# Patient Record
Sex: MALE | Race: WHITE | NOT HISPANIC OR LATINO | Employment: UNEMPLOYED | URBAN - NONMETROPOLITAN AREA
[De-identification: names, ages, dates, MRNs, and addresses within clinical notes are randomized per-mention and may not be internally consistent; named-entity substitution may affect disease eponyms.]

---

## 2017-01-03 DIAGNOSIS — G25.81 RESTLESS LEG SYNDROME: ICD-10-CM

## 2017-01-03 RX ORDER — ROPINIROLE 1 MG/1
1 TABLET, FILM COATED ORAL NIGHTLY
Qty: 30 TABLET | Refills: 2 | Status: SHIPPED | OUTPATIENT
Start: 2017-01-03 | End: 2017-04-06 | Stop reason: DRUGHIGH

## 2017-02-23 ENCOUNTER — OFFICE VISIT (OUTPATIENT)
Dept: FAMILY MEDICINE CLINIC | Facility: CLINIC | Age: 47
End: 2017-02-23

## 2017-02-23 ENCOUNTER — APPOINTMENT (OUTPATIENT)
Dept: LAB | Facility: HOSPITAL | Age: 47
End: 2017-02-23

## 2017-02-23 VITALS
SYSTOLIC BLOOD PRESSURE: 108 MMHG | HEIGHT: 67 IN | WEIGHT: 134 LBS | DIASTOLIC BLOOD PRESSURE: 68 MMHG | BODY MASS INDEX: 21.03 KG/M2

## 2017-02-23 DIAGNOSIS — G47.00 INSOMNIA, UNSPECIFIED TYPE: Primary | ICD-10-CM

## 2017-02-23 DIAGNOSIS — Z79.899 HIGH RISK MEDICATION USE: ICD-10-CM

## 2017-02-23 DIAGNOSIS — R53.83 FATIGUE, UNSPECIFIED TYPE: ICD-10-CM

## 2017-02-23 LAB
ALBUMIN SERPL-MCNC: 4.6 G/DL (ref 3.4–4.8)
ALBUMIN/GLOB SERPL: 1.6 G/DL (ref 1.1–1.8)
ALP SERPL-CCNC: 41 U/L (ref 38–126)
ALT SERPL W P-5'-P-CCNC: 24 U/L (ref 21–72)
ANION GAP SERPL CALCULATED.3IONS-SCNC: 10 MMOL/L (ref 5–15)
ARTICHOKE IGE QN: 148 MG/DL (ref 1–129)
AST SERPL-CCNC: 28 U/L (ref 17–59)
BILIRUB SERPL-MCNC: 0.5 MG/DL (ref 0.2–1.3)
BUN BLD-MCNC: 27 MG/DL (ref 7–21)
BUN/CREAT SERPL: 30 (ref 7–25)
CALCIUM SPEC-SCNC: 9.5 MG/DL (ref 8.4–10.2)
CHLORIDE SERPL-SCNC: 107 MMOL/L (ref 95–110)
CHOLEST SERPL-MCNC: 271 MG/DL (ref 0–199)
CO2 SERPL-SCNC: 24 MMOL/L (ref 22–31)
CREAT BLD-MCNC: 0.9 MG/DL (ref 0.7–1.3)
DEPRECATED RDW RBC AUTO: 41.8 FL (ref 35.1–43.9)
ERYTHROCYTE [DISTWIDTH] IN BLOOD BY AUTOMATED COUNT: 13.4 % (ref 11.5–14.5)
GFR SERPL CREATININE-BSD FRML MDRD: 90 ML/MIN/1.73 (ref 63–147)
GLOBULIN UR ELPH-MCNC: 2.9 GM/DL (ref 2.3–3.5)
GLUCOSE BLD-MCNC: 90 MG/DL (ref 60–100)
HBA1C MFR BLD: 5.61 % (ref 4–5.6)
HCT VFR BLD AUTO: 39.5 % (ref 39–49)
HDLC SERPL-MCNC: 60 MG/DL (ref 60–200)
HGB BLD-MCNC: 13.4 G/DL (ref 13.7–17.3)
LDLC/HDLC SERPL: 3.08 {RATIO} (ref 0–3.55)
MCH RBC QN AUTO: 29.1 PG (ref 26.5–34)
MCHC RBC AUTO-ENTMCNC: 33.9 G/DL (ref 31.5–36.3)
MCV RBC AUTO: 85.7 FL (ref 80–98)
PLATELET # BLD AUTO: 226 10*3/MM3 (ref 150–450)
PMV BLD AUTO: 9.4 FL (ref 8–12)
POTASSIUM BLD-SCNC: 4.5 MMOL/L (ref 3.5–5.1)
PROT SERPL-MCNC: 7.5 G/DL (ref 6.3–8.6)
RBC # BLD AUTO: 4.61 10*6/MM3 (ref 4.37–5.74)
SODIUM BLD-SCNC: 141 MMOL/L (ref 137–145)
TRIGL SERPL-MCNC: 131 MG/DL (ref 20–199)
TSH SERPL DL<=0.05 MIU/L-ACNC: 1.11 MIU/ML (ref 0.46–4.68)
VIT B12 BLD-MCNC: 377 PG/ML (ref 239–931)
WBC NRBC COR # BLD: 10.89 10*3/MM3 (ref 3.2–9.8)

## 2017-02-23 PROCEDURE — 80061 LIPID PANEL: CPT | Performed by: FAMILY MEDICINE

## 2017-02-23 PROCEDURE — 36415 COLL VENOUS BLD VENIPUNCTURE: CPT | Performed by: FAMILY MEDICINE

## 2017-02-23 PROCEDURE — 80053 COMPREHEN METABOLIC PANEL: CPT | Performed by: FAMILY MEDICINE

## 2017-02-23 PROCEDURE — 84443 ASSAY THYROID STIM HORMONE: CPT | Performed by: FAMILY MEDICINE

## 2017-02-23 PROCEDURE — 82607 VITAMIN B-12: CPT | Performed by: FAMILY MEDICINE

## 2017-02-23 PROCEDURE — 83036 HEMOGLOBIN GLYCOSYLATED A1C: CPT | Performed by: FAMILY MEDICINE

## 2017-02-23 PROCEDURE — 85027 COMPLETE CBC AUTOMATED: CPT | Performed by: FAMILY MEDICINE

## 2017-02-23 PROCEDURE — 82652 VIT D 1 25-DIHYDROXY: CPT | Performed by: FAMILY MEDICINE

## 2017-02-23 PROCEDURE — 99213 OFFICE O/P EST LOW 20 MIN: CPT | Performed by: FAMILY MEDICINE

## 2017-02-23 RX ORDER — QUETIAPINE FUMARATE 300 MG/1
300 TABLET, FILM COATED ORAL NIGHTLY
Qty: 30 TABLET | Refills: 2 | Status: SHIPPED | OUTPATIENT
Start: 2017-02-23 | End: 2017-05-30 | Stop reason: SDUPTHER

## 2017-02-23 NOTE — PROGRESS NOTES
"Subjective   Ehsan Pena is a 46 y.o. male.     History of Present Illness   Mr. Pena is a 45yo male that presents today for follow-up on insomnia and chronic fatigue.  He says that he has had issues with his sleep machine.  He had a new mask but he hasn't used it yet.  SOA is better.  Still feels very run down.  Takes him about 2 hours to fall asleep at night.  He has been taking seroquel at night and hasn't noticed that has helped him.  No side effects from the medication.        Current Outpatient Prescriptions:   •  QUEtiapine (SEROQUEL) 200 MG tablet, Take 1 tablet by mouth Every Night., Disp: 30 tablet, Rfl: 2  •  rOPINIRole (REQUIP) 1 MG tablet, Take 1 tablet by mouth Every Night. Take 1 hour before bedtime., Disp: 30 tablet, Rfl: 2    The following portions of the patient's history were reviewed and updated as appropriate: allergies, current medications, past family history, past medical history, past social history, past surgical history and problem list.    Review of Systems   Constitutional: Positive for fatigue. Negative for activity change, appetite change, chills, diaphoresis and fever.   HENT: Negative for congestion and sore throat.    Respiratory: Negative for cough, chest tightness and wheezing.    Cardiovascular: Negative for chest pain, palpitations and leg swelling.   Gastrointestinal: Negative for abdominal pain, nausea and vomiting.   Musculoskeletal: Negative for arthralgias, joint swelling, myalgias and neck pain.   Skin: Negative for rash.   Neurological: Negative for weakness, numbness and headaches.   Psychiatric/Behavioral: Positive for sleep disturbance. Negative for dysphoric mood. The patient is nervous/anxious.        Objective    Vitals:    02/23/17 1622   BP: 108/68   Weight: 134 lb (60.8 kg)   Height: 67\" (170.2 cm)     Physical Exam   Constitutional: He is oriented to person, place, and time. He appears well-developed and well-nourished. No distress.   Cardiovascular: Normal " rate, regular rhythm and normal heart sounds.    No murmur heard.  No LE edema.   Pulmonary/Chest: Effort normal and breath sounds normal. No respiratory distress.   Neurological: He is alert and oriented to person, place, and time.   Psychiatric: He has a normal mood and affect. Thought content normal.   Psychomotor agitation    Nursing note and vitals reviewed.      Assessment/Plan   Problems Addressed this Visit        Other    Fatigue    Relevant Orders    Comprehensive Metabolic Panel (Completed)    CBC (No Diff) (Completed)    Vitamin B12 (Completed)    Vitamin D 1,25 Dihydroxy    TSH (Completed)    Insomnia - Primary      Other Visit Diagnoses     High risk medication use        Relevant Orders    Comprehensive Metabolic Panel (Completed)    Hemoglobin A1c (Completed)    Lipid Panel (Completed)    CBC (No Diff) (Completed)    Vitamin B12 (Completed)    Vitamin D 1,25 Dihydroxy    TSH (Completed)        1.) Insomnia- Hasn't been using new mask for his CPAP.  He always seems to be resistant to trying what he thinks will help when it comes to the machine. Drug his feet to even try the machine.  Will try new mask. Increase seroquel and see if that helps.  2.) Fatigue-  Will recheck TSH and vitamin levels today.  RTC in 1.5 months or sooner PRN

## 2017-02-27 LAB — 1,25(OH)2D3 SERPL-MCNC: 82.3 PG/ML (ref 19.9–79.3)

## 2017-02-28 ENCOUNTER — APPOINTMENT (OUTPATIENT)
Dept: CT IMAGING | Facility: HOSPITAL | Age: 47
End: 2017-02-28

## 2017-04-06 ENCOUNTER — OFFICE VISIT (OUTPATIENT)
Dept: FAMILY MEDICINE CLINIC | Facility: CLINIC | Age: 47
End: 2017-04-06

## 2017-04-06 VITALS
WEIGHT: 137 LBS | BODY MASS INDEX: 21.5 KG/M2 | DIASTOLIC BLOOD PRESSURE: 76 MMHG | HEIGHT: 67 IN | SYSTOLIC BLOOD PRESSURE: 120 MMHG

## 2017-04-06 DIAGNOSIS — R53.82 CHRONIC FATIGUE: Primary | ICD-10-CM

## 2017-04-06 PROCEDURE — 99213 OFFICE O/P EST LOW 20 MIN: CPT | Performed by: FAMILY MEDICINE

## 2017-04-06 RX ORDER — ROPINIROLE 2 MG/1
2 TABLET, FILM COATED ORAL NIGHTLY
Qty: 30 TABLET | Refills: 2 | Status: SHIPPED | OUTPATIENT
Start: 2017-04-06 | End: 2017-05-30 | Stop reason: SDUPTHER

## 2017-04-06 NOTE — PROGRESS NOTES
"Subjective   Ehsan Pena is a 46 y.o. male.     History of Present Illness   Mr. Pena is a 45yo male that presents today for follow-up on sleep issues and chronic fatigue.  Since he was here last he has been trying his new mask and that has been difficult to get used to.    He says that he sleeps about 9 hours of sleep a night but it isn't restful.  He is waiting to see a new sleep specialist.  He has been taking seroquel and requip. He was told that his legs were jerking a lot in his sleep and he never had noticed that.  He seems to think that was part of the issue.      Current Outpatient Prescriptions:   •  QUEtiapine (SEROQUEL) 300 MG tablet, Take 1 tablet by mouth Every Night., Disp: 30 tablet, Rfl: 2  •  rOPINIRole (REQUIP) 1 MG tablet, Take 1 tablet by mouth Every Night. Take 1 hour before bedtime., Disp: 30 tablet, Rfl: 2    The following portions of the patient's history were reviewed and updated as appropriate: allergies, current medications, past family history, past medical history, past social history, past surgical history and problem list.    Review of Systems   Constitutional: Positive for fatigue. Negative for activity change, appetite change, chills, diaphoresis and fever.   HENT: Negative for congestion and sore throat.    Respiratory: Negative for cough, chest tightness and wheezing.    Cardiovascular: Negative for chest pain, palpitations and leg swelling.   Gastrointestinal: Negative for abdominal pain, nausea and vomiting.   Musculoskeletal: Negative for arthralgias, joint swelling, myalgias and neck pain.   Skin: Negative for rash.   Neurological: Negative for weakness, numbness and headaches.   Psychiatric/Behavioral: Positive for sleep disturbance. Negative for dysphoric mood. The patient is nervous/anxious.        Objective    Vitals:    04/06/17 1527   BP: 120/76   Weight: 137 lb (62.1 kg)   Height: 67\" (170.2 cm)     Physical Exam   Constitutional: He is oriented to person, place, and " time. He appears well-developed and well-nourished. No distress.   Cardiovascular: Normal rate, regular rhythm and normal heart sounds.    No murmur heard.  No LE edema.   Pulmonary/Chest: Effort normal and breath sounds normal. No respiratory distress.   Neurological: He is alert and oriented to person, place, and time.   Psychiatric: He has a normal mood and affect. Thought content normal.   Seemed more calm today   Nursing note and vitals reviewed.      Assessment/Plan   Problems Addressed this Visit        Other    Fatigue - Primary        Hopefully he will get back into see sleep doctor for re-eval when we get new person here.  Mask isn't working well for him.  Continue seroquel.  Will increase his requip and see if that helps with leg issue.  RTC in 2 months or sooner PRN

## 2017-04-11 ENCOUNTER — HOSPITAL ENCOUNTER (OUTPATIENT)
Dept: CT IMAGING | Facility: HOSPITAL | Age: 47
Discharge: HOME | End: 2017-04-11
Admitting: PSYCHIATRY & NEUROLOGY

## 2017-04-11 DIAGNOSIS — T85.113A: ICD-10-CM

## 2017-04-11 PROCEDURE — 70470 CT HEAD/BRAIN W/O & W/DYE: CPT

## 2017-04-11 PROCEDURE — 0 IOPAMIDOL 61 % SOLUTION: Performed by: PSYCHIATRY & NEUROLOGY

## 2017-04-11 RX ADMIN — IOPAMIDOL 91 ML: 612 INJECTION, SOLUTION INTRAVENOUS at 18:30

## 2017-05-18 ENCOUNTER — OFFICE VISIT (OUTPATIENT)
Dept: FAMILY MEDICINE CLINIC | Facility: CLINIC | Age: 47
End: 2017-05-18

## 2017-05-18 VITALS
BODY MASS INDEX: 21.88 KG/M2 | HEIGHT: 67 IN | DIASTOLIC BLOOD PRESSURE: 72 MMHG | SYSTOLIC BLOOD PRESSURE: 120 MMHG | WEIGHT: 139.4 LBS

## 2017-05-18 DIAGNOSIS — G47.30 SLEEP APNEA, UNSPECIFIED TYPE: Primary | ICD-10-CM

## 2017-05-18 DIAGNOSIS — R53.82 CHRONIC FATIGUE: ICD-10-CM

## 2017-05-18 PROCEDURE — 99213 OFFICE O/P EST LOW 20 MIN: CPT | Performed by: FAMILY MEDICINE

## 2017-05-30 ENCOUNTER — TELEPHONE (OUTPATIENT)
Dept: FAMILY MEDICINE CLINIC | Facility: CLINIC | Age: 47
End: 2017-05-30

## 2017-05-30 RX ORDER — ROPINIROLE 2 MG/1
2 TABLET, FILM COATED ORAL NIGHTLY
Qty: 30 TABLET | Refills: 2 | Status: SHIPPED | OUTPATIENT
Start: 2017-05-30 | End: 2017-09-18 | Stop reason: SDUPTHER

## 2017-05-30 RX ORDER — QUETIAPINE FUMARATE 300 MG/1
300 TABLET, FILM COATED ORAL NIGHTLY
Qty: 30 TABLET | Refills: 2 | Status: SHIPPED | OUTPATIENT
Start: 2017-05-30 | End: 2017-09-18 | Stop reason: SDUPTHER

## 2017-09-18 RX ORDER — QUETIAPINE FUMARATE 300 MG/1
300 TABLET, FILM COATED ORAL NIGHTLY
Qty: 30 TABLET | Refills: 2 | Status: SHIPPED | OUTPATIENT
Start: 2017-09-18 | End: 2018-01-15 | Stop reason: SDUPTHER

## 2017-09-18 RX ORDER — ROPINIROLE 2 MG/1
2 TABLET, FILM COATED ORAL NIGHTLY
Qty: 30 TABLET | Refills: 2 | Status: SHIPPED | OUTPATIENT
Start: 2017-09-18 | End: 2018-02-12 | Stop reason: SDUPTHER

## 2017-09-19 ENCOUNTER — TELEPHONE (OUTPATIENT)
Dept: FAMILY MEDICINE CLINIC | Facility: CLINIC | Age: 47
End: 2017-09-19

## 2017-09-19 NOTE — TELEPHONE ENCOUNTER
Refills were sent on 9/18/17, pharmacy called. They have the scripts that were   Message left letting the patient know that the scripts were sent yesterday and they pharmacy does have the scripts will be filled when it is time for them to be refilled.     I did leave my contact information & number incase he has any questions    ----- Message from Felicita Lee sent at 9/19/2017  2:59 PM CDT -----  Contact: Ehsan  Needs refills for Quetiapine 300 mg and Ropinirole 2 mg to [facility] seezuleyma Jarrell    [phone]

## 2017-10-09 ENCOUNTER — OFFICE VISIT (OUTPATIENT)
Dept: SLEEP MEDICINE | Facility: HOSPITAL | Age: 47
End: 2017-10-09

## 2017-10-09 VITALS — BODY MASS INDEX: 22.08 KG/M2 | WEIGHT: 141 LBS | SYSTOLIC BLOOD PRESSURE: 120 MMHG | DIASTOLIC BLOOD PRESSURE: 66 MMHG

## 2017-10-09 DIAGNOSIS — F51.04 PSYCHOPHYSIOLOGICAL INSOMNIA: ICD-10-CM

## 2017-10-09 DIAGNOSIS — G47.33 OBSTRUCTIVE SLEEP APNEA, ADULT: Primary | ICD-10-CM

## 2017-10-09 PROCEDURE — 99213 OFFICE O/P EST LOW 20 MIN: CPT | Performed by: INTERNAL MEDICINE

## 2017-10-09 NOTE — PROGRESS NOTES
Sleep Clinic Follow Up    Date: 10/9/2017  Primary Care Physician: Bebe Jarrell MD      Interim History (1/3):  Since the last visit on 11/29/2016, patient has:      1)  RADHA - Has not remained compliant with CPAP. He has no compliance since 03/2017. The machine has not been turned on since 03/2017. He gets very frustrated with use and says mask is uncomfortable. He wears it only for ~ 15 minutes. He is on seroquel. He averages 1 night per month with difficulty returning to sleep. He is on seroquel.    PAP Data:  Time frame: 09/03/2016 - 03/28/2017   Compliance 9.4 %  PAP range : 5-12 cm H2O  Average 90% pressure: 7.2-9.2 cmH2O  Leak: 13.75 minutes   Average AHI 12.9 events/hr  Mask type: FFM and nasal mask  DME: [facility]    Bed time: 0100  Sleep latency: 120 minutes  Number of times awakens during the night: 0-2  Wake time: 1300  Estimated total sleep time at night: 9 hours  Caffeine intake: 2 soda  Alcohol intake: none  Nap time: none  Sleepiness with Driving: none    [place] - 1    2) Patient denies RLS symptoms.     PMHx, FH, SH reviewed and pertinent changes are: increase in seroquel      REVIEW OF SYSTEMS:   Negative for chest pain, fever, chills, SOA, abdominal pain. Smoking: none      Exam (6-11/12):    Vitals:    10/09/17 1558   BP: 120/66     Body mass index is 22.08 kg/(m^2).  Gen:  No distress, conversant, pleasant, appears stated age, alert, oriented  Eyes:   Anicteric sclera, moist conjunctiva, no lid lag     PERRLA, EOMI   Heent:   NC/AT    Oropharynx clear, Mallampati 3    normal hearing  Lungs:  Normal effort, non-labored breathing    Clear to auscultation    CV:  Normal S1/S2, without murmur    No lower extremity edema  ABD:  Soft, normal bowel sounds    Skin:  Normal tone, texture and turgor    Psych:  Appropriate affect  Neuro:  CN 2-12 intact, odd affect, ? Mild MR /Autism        Past Medical History:   Diagnosis Date   • Dyspnea on exertion    • Fatigue    • Impulse control disorder    •  Insomnia    • Major depressive disorder, recurrent    • Restless legs    • Sleep apnea    • Spina bifida        Current Outpatient Prescriptions:   •  carbamide peroxide (DEBROX) 6.5 % otic solution, Administer 5 drops into both ears As Needed for Ear Pain., Disp: 22 mL, Rfl: 2  •  QUEtiapine (SEROQUEL) 300 MG tablet, Take 1 tablet by mouth Every Night., Disp: 30 tablet, Rfl: 2  •  rOPINIRole (REQUIP) 2 MG tablet, Take 1 tablet by mouth Every Night., Disp: 30 tablet, Rfl: 2      ASSESSMENT:     1. Obstructive sleep apnea  1. PSG on 09/23/2015, AHI of 10.6  2. Previous PSG in [place] ([place]?)  3. Currently on 5-12 cm H2O  4. Non-compliant with CPAP  5. Poor mask fit   6. Setup for sleep education  7. Script for PAP supplies for 3 months  8. Return to clinic in 3 months after sleep education.  2. Insomnia - sleep onset - would stop seroquel   3. Insomnia - sleep maintenance - very mild  4. RLS   1. On ropinirole, suggest stoping 1 day per week  5. Chronic fatigue - present for 17 years, worse the last 8 years.    Total time 20 min, more than half spent in face to face counseling and coordination of care.     This document has been electronically signed by Quinton Lopez MD on October 9, 2017         CC: Bebe Jarrell MD          No ref. provider found

## 2017-10-10 ENCOUNTER — OFFICE VISIT (OUTPATIENT)
Dept: FAMILY MEDICINE CLINIC | Facility: CLINIC | Age: 47
End: 2017-10-10

## 2017-10-10 VITALS
DIASTOLIC BLOOD PRESSURE: 62 MMHG | BODY MASS INDEX: 22.32 KG/M2 | HEIGHT: 67 IN | WEIGHT: 142.2 LBS | SYSTOLIC BLOOD PRESSURE: 110 MMHG

## 2017-10-10 DIAGNOSIS — R73.03 PREDIABETES: ICD-10-CM

## 2017-10-10 DIAGNOSIS — R53.82 CHRONIC FATIGUE: ICD-10-CM

## 2017-10-10 DIAGNOSIS — J30.89 NON-SEASONAL ALLERGIC RHINITIS, UNSPECIFIED CHRONICITY, UNSPECIFIED TRIGGER: ICD-10-CM

## 2017-10-10 DIAGNOSIS — G47.00 INSOMNIA, UNSPECIFIED TYPE: Primary | ICD-10-CM

## 2017-10-10 DIAGNOSIS — Z13.220 LIPID SCREENING: ICD-10-CM

## 2017-10-10 DIAGNOSIS — Z23 NEED FOR IMMUNIZATION AGAINST INFLUENZA: ICD-10-CM

## 2017-10-10 PROCEDURE — 90471 IMMUNIZATION ADMIN: CPT | Performed by: FAMILY MEDICINE

## 2017-10-10 PROCEDURE — 90686 IIV4 VACC NO PRSV 0.5 ML IM: CPT | Performed by: FAMILY MEDICINE

## 2017-10-10 PROCEDURE — 99213 OFFICE O/P EST LOW 20 MIN: CPT | Performed by: FAMILY MEDICINE

## 2017-10-10 RX ORDER — FLUTICASONE PROPIONATE 50 MCG
2 SPRAY, SUSPENSION (ML) NASAL DAILY
Qty: 1 BOTTLE | Refills: 1 | Status: SHIPPED | OUTPATIENT
Start: 2017-10-10 | End: 2018-04-05

## 2017-10-11 ENCOUNTER — APPOINTMENT (OUTPATIENT)
Dept: LAB | Facility: HOSPITAL | Age: 47
End: 2017-10-11

## 2017-10-11 LAB
ARTICHOKE IGE QN: 163 MG/DL (ref 1–129)
CHOLEST SERPL-MCNC: 254 MG/DL (ref 0–199)
HBA1C MFR BLD: 5.5 % (ref 4–5.6)
HDLC SERPL-MCNC: 52 MG/DL (ref 60–200)
LDLC/HDLC SERPL: 3.03 {RATIO} (ref 0–3.55)
TRIGL SERPL-MCNC: 222 MG/DL (ref 20–199)

## 2017-10-11 PROCEDURE — 80061 LIPID PANEL: CPT | Performed by: FAMILY MEDICINE

## 2017-10-11 PROCEDURE — 36415 COLL VENOUS BLD VENIPUNCTURE: CPT | Performed by: FAMILY MEDICINE

## 2017-10-11 PROCEDURE — 83036 HEMOGLOBIN GLYCOSYLATED A1C: CPT | Performed by: FAMILY MEDICINE

## 2017-10-13 PROBLEM — E78.2 MIXED HYPERLIPIDEMIA: Status: ACTIVE | Noted: 2017-10-13

## 2017-10-13 RX ORDER — SIMVASTATIN 40 MG
40 TABLET ORAL NIGHTLY
Qty: 90 TABLET | Refills: 2 | Status: SHIPPED | OUTPATIENT
Start: 2017-10-13 | End: 2018-02-12 | Stop reason: SDUPTHER

## 2018-01-15 RX ORDER — QUETIAPINE FUMARATE 300 MG/1
TABLET, FILM COATED ORAL
Qty: 30 TABLET | Refills: 1 | Status: SHIPPED | OUTPATIENT
Start: 2018-01-15 | End: 2018-02-12 | Stop reason: SDUPTHER

## 2018-02-12 ENCOUNTER — TELEPHONE (OUTPATIENT)
Dept: FAMILY MEDICINE CLINIC | Facility: CLINIC | Age: 48
End: 2018-02-12

## 2018-02-12 RX ORDER — ROPINIROLE 2 MG/1
2 TABLET, FILM COATED ORAL NIGHTLY
Qty: 30 TABLET | Refills: 2 | Status: SHIPPED | OUTPATIENT
Start: 2018-02-12 | End: 2018-06-03 | Stop reason: SDUPTHER

## 2018-02-12 RX ORDER — QUETIAPINE FUMARATE 300 MG/1
300 TABLET, FILM COATED ORAL NIGHTLY
Qty: 30 TABLET | Refills: 2 | Status: SHIPPED | OUTPATIENT
Start: 2018-02-12 | End: 2018-06-04 | Stop reason: SDUPTHER

## 2018-02-12 RX ORDER — SIMVASTATIN 40 MG
40 TABLET ORAL NIGHTLY
Qty: 90 TABLET | Refills: 2 | Status: SHIPPED | OUTPATIENT
Start: 2018-02-12 | End: 2018-06-04 | Stop reason: SDUPTHER

## 2018-02-12 NOTE — TELEPHONE ENCOUNTER
Requested Refills Sent    ----- Message from Felicita Lee sent at 2/12/2018  3:23 PM CST -----  Contact: MARJORIE  Needs refills for   Ropinirole 2 mg  Simvastatin 40 mg  Quetiapine 300 mg    To [facility]

## 2018-04-05 ENCOUNTER — OFFICE VISIT (OUTPATIENT)
Dept: FAMILY MEDICINE CLINIC | Facility: CLINIC | Age: 48
End: 2018-04-05

## 2018-04-05 VITALS
SYSTOLIC BLOOD PRESSURE: 110 MMHG | DIASTOLIC BLOOD PRESSURE: 74 MMHG | HEIGHT: 67 IN | BODY MASS INDEX: 22.29 KG/M2 | WEIGHT: 142 LBS

## 2018-04-05 DIAGNOSIS — G47.00 INSOMNIA, UNSPECIFIED TYPE: ICD-10-CM

## 2018-04-05 DIAGNOSIS — R53.82 CHRONIC FATIGUE: ICD-10-CM

## 2018-04-05 DIAGNOSIS — R53.83 FATIGUE, UNSPECIFIED TYPE: Primary | ICD-10-CM

## 2018-04-05 PROCEDURE — 99213 OFFICE O/P EST LOW 20 MIN: CPT | Performed by: FAMILY MEDICINE

## 2018-04-05 PROCEDURE — 96372 THER/PROPH/DIAG INJ SC/IM: CPT | Performed by: FAMILY MEDICINE

## 2018-04-05 RX ORDER — CYANOCOBALAMIN 1000 UG/ML
1000 INJECTION, SOLUTION INTRAMUSCULAR; SUBCUTANEOUS ONCE
Status: COMPLETED | OUTPATIENT
Start: 2018-04-05 | End: 2018-04-05

## 2018-04-05 RX ORDER — CYANOCOBALAMIN (VITAMIN B-12) 1000 MCG
500 TABLET, SUBLINGUAL SUBLINGUAL DAILY
Qty: 90 TABLET | Refills: 1 | Status: SHIPPED | OUTPATIENT
Start: 2018-04-05 | End: 2018-06-04 | Stop reason: SDUPTHER

## 2018-04-05 RX ADMIN — CYANOCOBALAMIN 1000 MCG: 1000 INJECTION, SOLUTION INTRAMUSCULAR; SUBCUTANEOUS at 16:32

## 2018-04-05 NOTE — PROGRESS NOTES
Subjective   Ehsan Pena is a 47 y.o. male.     Fatigue   This is a chronic problem. The current episode started more than 1 year ago. The problem occurs daily. The problem has been unchanged. Associated symptoms include fatigue. Pertinent negatives include no abdominal pain, anorexia, arthralgias, change in bowel habit, chest pain, chills, congestion, coughing, diaphoresis, fever, headaches, joint swelling, myalgias, nausea, neck pain, numbness, rash, sore throat, swollen glands, urinary symptoms, vertigo, visual change, vomiting or weakness. The symptoms are aggravated by stress. Treatments tried: He has been using CPAP at night.     Insomnia   This is a chronic problem. The current episode started more than 1 year ago. The problem occurs intermittently. The problem has been gradually improving. Associated symptoms include fatigue. Pertinent negatives include no abdominal pain, anorexia, arthralgias, change in bowel habit, chest pain, chills, congestion, coughing, diaphoresis, fever, headaches, joint swelling, myalgias, nausea, neck pain, numbness, rash, sore throat, swollen glands, urinary symptoms, vertigo, visual change, vomiting or weakness. Nothing aggravates the symptoms. Treatments tried: seroquel and CPAP. The treatment provided mild relief.          Current Outpatient Prescriptions:   •  carbamide peroxide (DEBROX) 6.5 % otic solution, Administer 5 drops into both ears As Needed for Ear Pain., Disp: 22 mL, Rfl: 2  •  QUEtiapine (SEROquel) 300 MG tablet, Take 1 tablet by mouth Every Night., Disp: 30 tablet, Rfl: 2  •  rOPINIRole (REQUIP) 2 MG tablet, Take 1 tablet by mouth Every Night., Disp: 30 tablet, Rfl: 2  •  simvastatin (ZOCOR) 40 MG tablet, Take 1 tablet by mouth Every Night., Disp: 90 tablet, Rfl: 2    The following portions of the patient's history were reviewed and updated as appropriate: allergies, current medications, past family history, past medical history, past social history, past  "surgical history and problem list.    Review of Systems   Constitutional: Positive for fatigue. Negative for activity change, appetite change, chills, diaphoresis, fever and unexpected weight change.   HENT: Negative for congestion and sore throat.    Respiratory: Negative for cough, shortness of breath and wheezing.    Cardiovascular: Negative for chest pain, palpitations and leg swelling.   Gastrointestinal: Negative for abdominal distention, abdominal pain, anorexia, change in bowel habit, constipation, diarrhea, nausea and vomiting.   Musculoskeletal: Negative for arthralgias, joint swelling, myalgias and neck pain.   Skin: Negative for rash.   Neurological: Negative for vertigo, weakness, numbness and headaches.   Psychiatric/Behavioral: The patient has insomnia.        Objective    Vitals:    04/05/18 1612   BP: 110/74   Weight: 64.4 kg (142 lb)   Height: 170.2 cm (67\")       Physical Exam   Constitutional: He is oriented to person, place, and time. He appears well-developed and well-nourished. No distress.   Cardiovascular: Normal rate, regular rhythm and normal heart sounds.    No murmur heard.  No LE edema.   Pulmonary/Chest: Effort normal and breath sounds normal. No respiratory distress.   Abdominal: Soft. Bowel sounds are normal. He exhibits no distension. There is no tenderness.   Neurological: He is alert and oriented to person, place, and time.   Psychiatric: He has a normal mood and affect. His behavior is normal. Judgment and thought content normal.   Nursing note and vitals reviewed.      Assessment/Plan   Problems Addressed this Visit        Other    Fatigue - Primary    Relevant Medications    cyanocobalamin injection 1,000 mcg (Completed)    Insomnia      Other Visit Diagnoses    None.       He has overall been doing better as far as sleep is concerned. He is still feeling fatigued all the time. I think that it may be stress related, but patient doesn't think it is. He has a follow-up with " Jessica, Sleep Medicine.  He is getting a new mash and chin strap for his machine, but he has said that before.  Hoping that he gets it and it helps him with compliance.  Reviewed labs.  Vitamin B12 was on the lower side of normal. Will try supplementation and see if that helps fatigue. Gave shot today and called in supplement for him to take at home.  RTC in 2-3 months or sooner PRN

## 2018-06-04 RX ORDER — CYANOCOBALAMIN (VITAMIN B-12) 1000 MCG
500 TABLET, SUBLINGUAL SUBLINGUAL DAILY
Qty: 90 TABLET | Refills: 1 | Status: SHIPPED | OUTPATIENT
Start: 2018-06-04 | End: 2018-11-13

## 2018-06-04 RX ORDER — ROPINIROLE 2 MG/1
2 TABLET, FILM COATED ORAL NIGHTLY
Qty: 30 TABLET | Refills: 0 | Status: SHIPPED | OUTPATIENT
Start: 2018-06-04 | End: 2018-06-04 | Stop reason: SDUPTHER

## 2018-06-04 RX ORDER — QUETIAPINE FUMARATE 300 MG/1
300 TABLET, FILM COATED ORAL NIGHTLY
Qty: 30 TABLET | Refills: 2 | Status: SHIPPED | OUTPATIENT
Start: 2018-06-04 | End: 2018-09-10 | Stop reason: SDUPTHER

## 2018-06-04 RX ORDER — ROPINIROLE 2 MG/1
2 TABLET, FILM COATED ORAL NIGHTLY
Qty: 30 TABLET | Refills: 1 | Status: SHIPPED | OUTPATIENT
Start: 2018-06-04 | End: 2018-09-10 | Stop reason: SDUPTHER

## 2018-06-04 RX ORDER — SIMVASTATIN 40 MG
40 TABLET ORAL NIGHTLY
Qty: 90 TABLET | Refills: 2 | Status: SHIPPED | OUTPATIENT
Start: 2018-06-04 | End: 2018-09-10 | Stop reason: SDUPTHER

## 2018-06-05 ENCOUNTER — OFFICE VISIT (OUTPATIENT)
Dept: FAMILY MEDICINE CLINIC | Facility: CLINIC | Age: 48
End: 2018-06-05

## 2018-06-05 VITALS
BODY MASS INDEX: 23.02 KG/M2 | SYSTOLIC BLOOD PRESSURE: 114 MMHG | WEIGHT: 146.7 LBS | HEIGHT: 67 IN | DIASTOLIC BLOOD PRESSURE: 90 MMHG

## 2018-06-05 DIAGNOSIS — G47.30 SLEEP APNEA, UNSPECIFIED TYPE: Primary | ICD-10-CM

## 2018-06-05 DIAGNOSIS — R53.82 CHRONIC FATIGUE: ICD-10-CM

## 2018-06-05 DIAGNOSIS — G47.00 INSOMNIA, UNSPECIFIED TYPE: ICD-10-CM

## 2018-06-05 PROCEDURE — 99213 OFFICE O/P EST LOW 20 MIN: CPT | Performed by: FAMILY MEDICINE

## 2018-06-05 NOTE — PROGRESS NOTES
"Subjective   Ehsan Pena is a 47 y.o. male.     History of Present Illness   Mr. Pena is a 46yo male that presents today for follow-up on chronic fatigue. He says that his sleep has been better since he was here last time.  He never  up his vitamin b12 supplement because he forgot that it was being called in.  He says that his fatigue is about the same. He hasn't been using his CPAP machine and he missed his last appointment with Sleep Medicine.  He needs to reschedule that appointment.  He has had issues with anxiety and depression.  He doesn't think that is the cause of his fatigue. He thinks that there is a \"physical problem\".        Current Outpatient Prescriptions:   •  Cyanocobalamin (VITAMIN B-12) 500 MCG sublingual tablet, Place 500 mcg under the tongue Daily., Disp: 90 tablet, Rfl: 1  •  QUEtiapine (SEROquel) 300 MG tablet, Take 1 tablet by mouth Every Night., Disp: 30 tablet, Rfl: 2  •  rOPINIRole (REQUIP) 2 MG tablet, Take 1 tablet by mouth Every Night., Disp: 30 tablet, Rfl: 1  •  simvastatin (ZOCOR) 40 MG tablet, Take 1 tablet by mouth Every Night., Disp: 90 tablet, Rfl: 2    The following portions of the patient's history were reviewed and updated as appropriate: allergies, current medications, past family history, past medical history, past social history, past surgical history and problem list.    Review of Systems   Constitutional: Positive for fatigue. Negative for activity change, appetite change, chills, diaphoresis, fever and unexpected weight change.   HENT: Negative for congestion and sore throat.    Respiratory: Negative for cough, shortness of breath and wheezing.    Cardiovascular: Negative for chest pain, palpitations and leg swelling.   Gastrointestinal: Negative for abdominal distention, abdominal pain, constipation, diarrhea, nausea and vomiting.   Musculoskeletal: Negative for arthralgias, joint swelling, myalgias and neck pain.   Skin: Negative for rash.   Neurological: " "Negative for weakness, numbness and headaches.   Psychiatric/Behavioral: Positive for behavioral problems. Negative for dysphoric mood and sleep disturbance. The patient is not nervous/anxious.        Objective    Vitals:    06/05/18 1522   BP: 114/90   Weight: 66.5 kg (146 lb 11.2 oz)   Height: 170.2 cm (67\")       Physical Exam   Constitutional: He is oriented to person, place, and time. He appears well-developed and well-nourished. No distress.   Cardiovascular: Normal rate, regular rhythm and normal heart sounds.    No murmur heard.  No LE edema.   Pulmonary/Chest: Effort normal and breath sounds normal. No respiratory distress.   Abdominal: Soft. Bowel sounds are normal. He exhibits no distension. There is no tenderness.   Neurological: He is alert and oriented to person, place, and time.   Psychiatric:   Bizarre behavior at times.   Nursing note and vitals reviewed.      Assessment/Plan   Problems Addressed this Visit        Other    Fatigue    Insomnia    Sleep apnea - Primary        Chronic fatigue may be related to his nonadherance to CPAP.  He missed his appointment with sleep medicine. Recommended that he go and reschedule with Dr. Lopez.  He hasn't been using his CPAP at all.  Says that he feels he is sleeping well. He hasn't been taking the vitamin b12 that I recommended because there was a three dollar co-pay and he is having money problems.  Injection didn't seem to help much.    Will not change medications at this point. His labs were normal last time.  I feel that it is possible that his fatigue is related to mood disorder and depression, but patient feel that there is something physically wrong with him.    Needs to work on compliance and he may feel better.  Stressed that to him today.  RTC in 3 months or sooner PRN           "

## 2018-07-06 ENCOUNTER — TELEPHONE (OUTPATIENT)
Dept: FAMILY MEDICINE CLINIC | Facility: CLINIC | Age: 48
End: 2018-07-06

## 2018-09-10 RX ORDER — ROPINIROLE 2 MG/1
2 TABLET, FILM COATED ORAL NIGHTLY
Qty: 90 TABLET | Refills: 0 | Status: SHIPPED | OUTPATIENT
Start: 2018-09-10 | End: 2018-11-13 | Stop reason: SDUPTHER

## 2018-09-10 RX ORDER — QUETIAPINE FUMARATE 300 MG/1
300 TABLET, FILM COATED ORAL NIGHTLY
Qty: 90 TABLET | Refills: 0 | Status: SHIPPED | OUTPATIENT
Start: 2018-09-10 | End: 2018-11-13 | Stop reason: SDUPTHER

## 2018-09-10 RX ORDER — SIMVASTATIN 40 MG
40 TABLET ORAL NIGHTLY
Qty: 90 TABLET | Refills: 0 | Status: SHIPPED | OUTPATIENT
Start: 2018-09-10 | End: 2018-11-13 | Stop reason: SDUPTHER

## 2018-11-13 ENCOUNTER — OFFICE VISIT (OUTPATIENT)
Dept: FAMILY MEDICINE CLINIC | Facility: CLINIC | Age: 48
End: 2018-11-13

## 2018-11-13 ENCOUNTER — APPOINTMENT (OUTPATIENT)
Dept: LAB | Facility: HOSPITAL | Age: 48
End: 2018-11-13

## 2018-11-13 VITALS
BODY MASS INDEX: 22.88 KG/M2 | WEIGHT: 145.8 LBS | HEIGHT: 67 IN | SYSTOLIC BLOOD PRESSURE: 108 MMHG | DIASTOLIC BLOOD PRESSURE: 78 MMHG

## 2018-11-13 DIAGNOSIS — G47.30 SLEEP APNEA, UNSPECIFIED TYPE: ICD-10-CM

## 2018-11-13 DIAGNOSIS — G25.81 RESTLESS LEG: ICD-10-CM

## 2018-11-13 DIAGNOSIS — Z13.29 SCREENING FOR HYPOTHYROIDISM: ICD-10-CM

## 2018-11-13 DIAGNOSIS — E78.2 MIXED HYPERLIPIDEMIA: Primary | ICD-10-CM

## 2018-11-13 DIAGNOSIS — F33.1 MODERATE EPISODE OF RECURRENT MAJOR DEPRESSIVE DISORDER (HCC): ICD-10-CM

## 2018-11-13 DIAGNOSIS — Z76.89 ENCOUNTER TO ESTABLISH CARE: ICD-10-CM

## 2018-11-13 LAB
ALBUMIN SERPL-MCNC: 4.6 G/DL (ref 3.4–4.8)
ALBUMIN/GLOB SERPL: 1.4 G/DL (ref 1.1–1.8)
ALP SERPL-CCNC: 47 U/L (ref 38–126)
ALT SERPL W P-5'-P-CCNC: 18 U/L (ref 21–72)
ANION GAP SERPL CALCULATED.3IONS-SCNC: 6 MMOL/L (ref 5–15)
ARTICHOKE IGE QN: 150 MG/DL (ref 1–129)
AST SERPL-CCNC: 26 U/L (ref 17–59)
BASOPHILS # BLD AUTO: 0.01 10*3/MM3 (ref 0–0.2)
BASOPHILS NFR BLD AUTO: 0.1 % (ref 0–2)
BILIRUB SERPL-MCNC: 0.7 MG/DL (ref 0.2–1.3)
BUN BLD-MCNC: 20 MG/DL (ref 7–21)
BUN/CREAT SERPL: 17.1 (ref 7–25)
CALCIUM SPEC-SCNC: 9.7 MG/DL (ref 8.4–10.2)
CHLORIDE SERPL-SCNC: 104 MMOL/L (ref 95–110)
CHOLEST SERPL-MCNC: 245 MG/DL (ref 0–199)
CO2 SERPL-SCNC: 29 MMOL/L (ref 22–31)
CREAT BLD-MCNC: 1.17 MG/DL (ref 0.7–1.3)
DEPRECATED RDW RBC AUTO: 42.5 FL (ref 35.1–43.9)
EOSINOPHIL # BLD AUTO: 0.07 10*3/MM3 (ref 0–0.7)
EOSINOPHIL NFR BLD AUTO: 0.9 % (ref 0–7)
ERYTHROCYTE [DISTWIDTH] IN BLOOD BY AUTOMATED COUNT: 13.3 % (ref 11.5–14.5)
GFR SERPL CREATININE-BSD FRML MDRD: 66 ML/MIN/1.73 (ref 63–147)
GLOBULIN UR ELPH-MCNC: 3.2 GM/DL (ref 2.3–3.5)
GLUCOSE BLD-MCNC: 99 MG/DL (ref 60–100)
HCT VFR BLD AUTO: 41.2 % (ref 39–49)
HDLC SERPL-MCNC: 52 MG/DL (ref 60–200)
HGB BLD-MCNC: 14 G/DL (ref 13.7–17.3)
IMM GRANULOCYTES # BLD: 0.01 10*3/MM3 (ref 0–0.02)
IMM GRANULOCYTES NFR BLD: 0.1 % (ref 0–0.5)
LDLC/HDLC SERPL: 2.97 {RATIO} (ref 0–3.55)
LYMPHOCYTES # BLD AUTO: 1.89 10*3/MM3 (ref 0.6–4.2)
LYMPHOCYTES NFR BLD AUTO: 24.6 % (ref 10–50)
MCH RBC QN AUTO: 29.5 PG (ref 26.5–34)
MCHC RBC AUTO-ENTMCNC: 34 G/DL (ref 31.5–36.3)
MCV RBC AUTO: 86.9 FL (ref 80–98)
MONOCYTES # BLD AUTO: 0.47 10*3/MM3 (ref 0–0.9)
MONOCYTES NFR BLD AUTO: 6.1 % (ref 0–12)
NEUTROPHILS # BLD AUTO: 5.22 10*3/MM3 (ref 2–8.6)
NEUTROPHILS NFR BLD AUTO: 68.2 % (ref 37–80)
PLATELET # BLD AUTO: 260 10*3/MM3 (ref 150–450)
PMV BLD AUTO: 10 FL (ref 8–12)
POTASSIUM BLD-SCNC: 4.6 MMOL/L (ref 3.5–5.1)
PROT SERPL-MCNC: 7.8 G/DL (ref 6.3–8.6)
RBC # BLD AUTO: 4.74 10*6/MM3 (ref 4.37–5.74)
SODIUM BLD-SCNC: 139 MMOL/L (ref 137–145)
TRIGL SERPL-MCNC: 193 MG/DL (ref 20–199)
TSH SERPL DL<=0.05 MIU/L-ACNC: 0.61 MIU/ML (ref 0.46–4.68)
WBC NRBC COR # BLD: 7.67 10*3/MM3 (ref 3.2–9.8)

## 2018-11-13 PROCEDURE — 80053 COMPREHEN METABOLIC PANEL: CPT | Performed by: NURSE PRACTITIONER

## 2018-11-13 PROCEDURE — 85025 COMPLETE CBC W/AUTO DIFF WBC: CPT | Performed by: NURSE PRACTITIONER

## 2018-11-13 PROCEDURE — 99214 OFFICE O/P EST MOD 30 MIN: CPT | Performed by: NURSE PRACTITIONER

## 2018-11-13 PROCEDURE — 80061 LIPID PANEL: CPT | Performed by: NURSE PRACTITIONER

## 2018-11-13 PROCEDURE — 84443 ASSAY THYROID STIM HORMONE: CPT | Performed by: NURSE PRACTITIONER

## 2018-11-13 RX ORDER — QUETIAPINE FUMARATE 300 MG/1
300 TABLET, FILM COATED ORAL NIGHTLY
Qty: 90 TABLET | Refills: 0 | Status: SHIPPED | OUTPATIENT
Start: 2018-11-13 | End: 2019-01-03 | Stop reason: SDUPTHER

## 2018-11-13 RX ORDER — ROPINIROLE 2 MG/1
2 TABLET, FILM COATED ORAL NIGHTLY
Qty: 90 TABLET | Refills: 0 | Status: SHIPPED | OUTPATIENT
Start: 2018-11-13 | End: 2019-01-03 | Stop reason: SDUPTHER

## 2018-11-13 RX ORDER — SIMVASTATIN 40 MG
40 TABLET ORAL NIGHTLY
Qty: 90 TABLET | Refills: 0 | Status: SHIPPED | OUTPATIENT
Start: 2018-11-13 | End: 2018-11-14 | Stop reason: DRUGHIGH

## 2018-11-13 NOTE — PROGRESS NOTES
"Subjective   Ehsan Pena is a 48 y.o. male.  Establish primary care.  Has Restless leg due to his spina bifida and is currently on Requip. \"I am sleeping good but still having about 2 days a month that are pretty bad.  I still stay tired all the time every day.  I feel fatigued and worn out every day.\"  Had an appointment last year to see Dr. Lopez for sleep study but was unable to find transportation to the appointment.    Fatigue   This is a chronic problem. The current episode started more than 1 year ago. The problem occurs daily. The problem has been gradually improving. Associated symptoms include fatigue. Pertinent negatives include no abdominal pain, anorexia, arthralgias, change in bowel habit, chest pain, chills, congestion, coughing, diaphoresis, fever, headaches, joint swelling, myalgias, nausea, neck pain, numbness, rash, sore throat, swollen glands, urinary symptoms, vertigo, visual change, vomiting or weakness. The symptoms are aggravated by stress. He has tried rest for the symptoms. The treatment provided moderate relief.   Insomnia   This is a chronic problem. The current episode started more than 1 year ago. The problem occurs intermittently. The problem has been gradually improving. Associated symptoms include fatigue. Pertinent negatives include no abdominal pain, anorexia, arthralgias, change in bowel habit, chest pain, chills, congestion, coughing, diaphoresis, fever, headaches, joint swelling, myalgias, nausea, neck pain, numbness, rash, sore throat, swollen glands, urinary symptoms, vertigo, visual change, vomiting or weakness. Nothing aggravates the symptoms. Treatments tried: seroquel  The treatment provided mild relief.   Hyperlipidemia   This is a chronic problem. The current episode started more than 1 year ago. The problem is controlled. There are no known factors aggravating his hyperlipidemia. Pertinent negatives include no chest pain or myalgias. Current antihyperlipidemic " treatment includes statins. The current treatment provides moderate improvement of lipids. There are no compliance problems.  Risk factors for coronary artery disease include stress, dyslipidemia and male sex.        The following portions of the patient's history were reviewed and updated as appropriate:     He  has a past medical history of Dyspnea on exertion, Fatigue, Impulse control disorder, Insomnia, Major depressive disorder, recurrent (CMS/HCC), Restless legs, Sleep apnea, and Spina bifida (CMS/HCC).  He does not have any pertinent problems on file.  He  has a past surgical history that includes Injection of Medication (10/12/2015).  His family history includes Diabetes in his maternal grandmother; Heart disease in his father; Stroke in his mother.  He  reports that  has never smoked. he has never used smokeless tobacco. He reports that he does not drink alcohol or use drugs.  Current Outpatient Medications   Medication Sig Dispense Refill   • QUEtiapine (SEROquel) 300 MG tablet Take 1 tablet by mouth Every Night. 90 tablet 0   • rOPINIRole (REQUIP) 2 MG tablet Take 1 tablet by mouth Every Night. 90 tablet 0   • simvastatin (ZOCOR) 40 MG tablet Take 1 tablet by mouth Every Night. 90 tablet 0     No current facility-administered medications for this visit.      Current Outpatient Medications on File Prior to Visit   Medication Sig   • [DISCONTINUED] Cyanocobalamin (VITAMIN B-12) 500 MCG sublingual tablet Place 500 mcg under the tongue Daily.   • [DISCONTINUED] QUEtiapine (SEROquel) 300 MG tablet Take 1 tablet by mouth Every Night.   • [DISCONTINUED] rOPINIRole (REQUIP) 2 MG tablet Take 1 tablet by mouth Every Night.   • [DISCONTINUED] simvastatin (ZOCOR) 40 MG tablet Take 1 tablet by mouth Every Night.     No current facility-administered medications on file prior to visit.      He is allergic to penicillins..    Review of Systems   Constitutional: Positive for fatigue. Negative for chills, diaphoresis and  fever.   HENT: Negative.  Negative for congestion and sore throat.    Eyes: Negative.    Respiratory: Negative.  Negative for cough.    Cardiovascular: Negative.  Negative for chest pain.   Gastrointestinal: Negative.  Negative for abdominal pain, anorexia, change in bowel habit, nausea and vomiting.   Genitourinary: Negative.    Musculoskeletal: Negative.  Negative for arthralgias, joint swelling, myalgias and neck pain.   Skin: Negative.  Negative for rash.   Neurological: Negative.  Negative for vertigo, weakness, numbness and headaches.   Psychiatric/Behavioral: Negative for confusion. The patient has insomnia.        Objective   Physical Exam   Constitutional: He is oriented to person, place, and time. He appears well-developed and well-nourished.   HENT:   Head: Normocephalic.   Right Ear: External ear normal.   Left Ear: External ear normal.   Eyes: EOM are normal. Pupils are equal, round, and reactive to light.   Neck: Normal range of motion. Neck supple.   Cardiovascular: Normal rate, regular rhythm and normal heart sounds.   Pulmonary/Chest: Effort normal and breath sounds normal.   Abdominal: Soft. Bowel sounds are normal.   Musculoskeletal: Normal range of motion.   Neurological: He is alert and oriented to person, place, and time.   Skin: Skin is warm. Capillary refill takes less than 2 seconds.   Psychiatric: He has a normal mood and affect. His behavior is normal.   Nursing note and vitals reviewed.      Assessment/Plan   Problems Addressed this Visit        Cardiovascular and Mediastinum    Mixed hyperlipidemia - Primary    Relevant Medications    simvastatin (ZOCOR) 40 MG tablet    Other Relevant Orders    Lipid panel       Respiratory    Sleep apnea    Relevant Orders    Ambulatory Referral to Sleep Medicine       Other    Major depressive disorder, recurrent (CMS/HCC)    Relevant Medications    QUEtiapine (SEROquel) 300 MG tablet    Other Relevant Orders    CBC & Differential    Comprehensive  Metabolic Panel    Restless leg    Relevant Medications    rOPINIRole (REQUIP) 2 MG tablet      Other Visit Diagnoses     Screening for hypothyroidism        Relevant Orders    TSH    Encounter to establish care            1.  Mixed hyperlipidemia:  Continue on Zocor as previously prescribed and refill prescription sent to pharmacy  Complete fasting lipid panel as ordered and will notify of results when available  Encouraged to adhere to low-fat diet    2.  Sleep apnea:  Referral placed to sleep medicine an appointment arranged with Dr. Lopez on February 18 of 4 PM.    3.  Major depressive disorder, recurrent:  Continue on Seroquel as previously prescribed and refill prescription sent to pharmacy  Educated on possible side effects of this medication including but not limited to increased risk for suicidal/homicidal ideations  Encouraged to seek emergency medical treatment for any suicidal or homicidal ideations  Complete CBC and chemistry panel as ordered and will notify of results when available    4.  Restless leg:  Continue on Requip as previously prescribed and refill prescription sent to pharmacy  Educated on possible sedative side effects of this medication and encouraged not to take this medication prior to working or driving    5.  Screening for hypothyroidism:  Complete TSH as ordered and will notify of results when available    6.  Encounter to establish care:  Continue on current medications as previously prescribed   Schedule follow-up appointment with this office in 3 months for recheck or sooner as needed        This document has been electronically signed by TRI Lanier on November 13, 2018 4:04 PM

## 2018-11-14 DIAGNOSIS — E78.2 MIXED HYPERLIPIDEMIA: Primary | ICD-10-CM

## 2018-11-14 RX ORDER — SIMVASTATIN 80 MG
80 TABLET ORAL NIGHTLY
Qty: 30 TABLET | Refills: 5 | Status: SHIPPED | OUTPATIENT
Start: 2018-11-14 | End: 2019-01-03 | Stop reason: SDUPTHER

## 2019-01-03 ENCOUNTER — TELEPHONE (OUTPATIENT)
Dept: FAMILY MEDICINE CLINIC | Facility: CLINIC | Age: 49
End: 2019-01-03

## 2019-01-03 DIAGNOSIS — E78.2 MIXED HYPERLIPIDEMIA: ICD-10-CM

## 2019-01-03 DIAGNOSIS — F33.1 MODERATE EPISODE OF RECURRENT MAJOR DEPRESSIVE DISORDER (HCC): ICD-10-CM

## 2019-01-03 DIAGNOSIS — G25.81 RESTLESS LEG: ICD-10-CM

## 2019-01-03 RX ORDER — QUETIAPINE FUMARATE 300 MG/1
300 TABLET, FILM COATED ORAL NIGHTLY
Qty: 90 TABLET | Refills: 0 | Status: SHIPPED | OUTPATIENT
Start: 2019-01-03 | End: 2019-03-13 | Stop reason: SDUPTHER

## 2019-01-03 RX ORDER — ROPINIROLE 2 MG/1
2 TABLET, FILM COATED ORAL NIGHTLY
Qty: 90 TABLET | Refills: 0 | Status: SHIPPED | OUTPATIENT
Start: 2019-01-03 | End: 2019-03-13 | Stop reason: SDUPTHER

## 2019-01-03 RX ORDER — SIMVASTATIN 80 MG
80 TABLET ORAL NIGHTLY
Qty: 30 TABLET | Refills: 5 | Status: SHIPPED | OUTPATIENT
Start: 2019-01-03 | End: 2019-03-13 | Stop reason: SDUPTHER

## 2019-01-03 NOTE — TELEPHONE ENCOUNTER
Needs refill for Simvastatin 80 mg, Quetiapine 300 mg and Ropinirole 2 mg sent to [facility].  He would not give me a working phone number if you need to contact him.

## 2019-01-03 NOTE — TELEPHONE ENCOUNTER
Requested Refills Sent to [facility]  Patient has an appointment scheduled with Wilma in February

## 2019-02-18 ENCOUNTER — OFFICE VISIT (OUTPATIENT)
Dept: SLEEP MEDICINE | Facility: HOSPITAL | Age: 49
End: 2019-02-18

## 2019-02-18 VITALS
OXYGEN SATURATION: 98 % | HEART RATE: 78 BPM | DIASTOLIC BLOOD PRESSURE: 95 MMHG | WEIGHT: 142.3 LBS | BODY MASS INDEX: 22.34 KG/M2 | SYSTOLIC BLOOD PRESSURE: 137 MMHG | HEIGHT: 67 IN

## 2019-02-18 DIAGNOSIS — G47.33 OBSTRUCTIVE SLEEP APNEA, ADULT: Primary | ICD-10-CM

## 2019-02-18 PROCEDURE — 99214 OFFICE O/P EST MOD 30 MIN: CPT | Performed by: INTERNAL MEDICINE

## 2019-02-18 NOTE — PROGRESS NOTES
Sleep Clinic Follow Up    Date: 2019  Primary Care Physician: Wilma Guerrero APRN    Last office visit: 10/09/2017 (I reviewed this note)    CC: Follow up: RADHA    Sleep Testin. PSG on 2015, AHI of 10.6  2. Previous PSG in [place] ([place]?)  3. Currently on 5-12 cm H2O    Assessment and Plan:    1. Obstructive sleep apnea Established, not controlled (2)  1. Compliant and improved with PAP therapy  2. Continue PAP as prescribed.   3. Script for PAP supplies  4. Suggest positional therapy with a pillow  2. Insomnia Established, not controlled (2)   1. Still on Seroquel 300 mg po qhs  3. RLS stable - established  1. On Requip 2mg  4. Autism  1. Repeats himself quite often - not sure how much he retains      Interim History (-3/4):  Since the last visit:    1) mild RADHA -  Ehsan Pena reports non-compliance with CPAP. He put nasal mask over his mouth instead of his nose. He admits the mask is uncomfortable. No PAP data was not available. We have tried sleep education, different mask, and re-education to no avail. Given his mild sx, positional therapy would be an option. Given he wore bite guard and tolerated in the past, will refer to Devika Welch for RADHA appliance eval.    DME: [facility]    Bed time: 0100  Sleep latency: 60 minutes  Number of times awakens during the night: 0-1  Wake time: 1300  Estimated total sleep time at night: 8 hours  Caffeine intake: 0oz of coffee, 0oz of tea, and 24oz of soda  Alcohol intake: 0 drinks per week  Nap time: rare   Sleepiness with Driving: none    PMHx, FH, SH reviewed and pertinent changes are: unchanged from last office visit on 10/29/2017      REVIEW OF SYSTEMS:   Negative for chest pain, fever, cough, SOA, abdominal pain. Smoking:none      Exam ():  Vitals:    19 1602   BP: 137/95   Pulse: 78   SpO2: 98%           19  1602   Weight: 64.5 kg (142 lb 4.8 oz)     Body mass index is 22.28 kg/m². Patient's Body mass index is 22.28  kg/m². BMI is above normal parameters. Recommendations include: referral to primary care.      Gen:  No acute distress, alert, oriented  Lungs:  CTA with normal effort   CV:  RRR, no M/R/G  GI:  soft, non-tender  Psych:  Appropriate affect        Past Medical History:   Diagnosis Date   • Dyspnea on exertion    • Fatigue    • Impulse control disorder    • Insomnia    • Major depressive disorder, recurrent (CMS/HCC)    • Restless legs    • Sleep apnea    • Spina bifida (CMS/HCC)        Current Outpatient Medications:   •  QUEtiapine (SEROquel) 300 MG tablet, Take 1 tablet by mouth Every Night., Disp: 90 tablet, Rfl: 0  •  rOPINIRole (REQUIP) 2 MG tablet, Take 1 tablet by mouth Every Night., Disp: 90 tablet, Rfl: 0  •  simvastatin (ZOCOR) 80 MG tablet, Take 1 tablet by mouth Every Night., Disp: 30 tablet, Rfl: 5      RTC in 12 months     This document has been electronically signed by Quinton Lopez MD on February 18, 2019         CC: Wilma Guerrero, RTI          No ref. provider found

## 2019-03-07 ENCOUNTER — TELEPHONE (OUTPATIENT)
Dept: FAMILY MEDICINE CLINIC | Facility: CLINIC | Age: 49
End: 2019-03-07

## 2019-03-13 ENCOUNTER — OFFICE VISIT (OUTPATIENT)
Dept: FAMILY MEDICINE CLINIC | Facility: CLINIC | Age: 49
End: 2019-03-13

## 2019-03-13 ENCOUNTER — APPOINTMENT (OUTPATIENT)
Dept: LAB | Facility: HOSPITAL | Age: 49
End: 2019-03-13

## 2019-03-13 VITALS
HEIGHT: 67 IN | WEIGHT: 141.8 LBS | DIASTOLIC BLOOD PRESSURE: 86 MMHG | SYSTOLIC BLOOD PRESSURE: 126 MMHG | BODY MASS INDEX: 22.26 KG/M2

## 2019-03-13 DIAGNOSIS — Z13.29 SCREENING FOR HYPOTHYROIDISM: Primary | ICD-10-CM

## 2019-03-13 DIAGNOSIS — F33.1 MODERATE EPISODE OF RECURRENT MAJOR DEPRESSIVE DISORDER (HCC): ICD-10-CM

## 2019-03-13 DIAGNOSIS — G25.81 RESTLESS LEG: ICD-10-CM

## 2019-03-13 DIAGNOSIS — E78.2 MIXED HYPERLIPIDEMIA: ICD-10-CM

## 2019-03-13 LAB
ALBUMIN SERPL-MCNC: 4.6 G/DL (ref 3.4–4.8)
ALBUMIN/GLOB SERPL: 1.5 G/DL (ref 1.1–1.8)
ALP SERPL-CCNC: 50 U/L (ref 38–126)
ALT SERPL W P-5'-P-CCNC: 22 U/L (ref 21–72)
ANION GAP SERPL CALCULATED.3IONS-SCNC: 7 MMOL/L (ref 5–15)
ARTICHOKE IGE QN: 197 MG/DL (ref 1–129)
AST SERPL-CCNC: 51 U/L (ref 17–59)
BASOPHILS # BLD AUTO: 0.05 10*3/MM3 (ref 0–0.2)
BASOPHILS NFR BLD AUTO: 0.8 % (ref 0–1.5)
BILIRUB SERPL-MCNC: 0.7 MG/DL (ref 0.2–1.3)
BUN BLD-MCNC: 18 MG/DL (ref 7–21)
BUN/CREAT SERPL: 15 (ref 7–25)
CALCIUM SPEC-SCNC: 9.7 MG/DL (ref 8.4–10.2)
CHLORIDE SERPL-SCNC: 102 MMOL/L (ref 95–110)
CHOLEST SERPL-MCNC: 302 MG/DL (ref 0–199)
CO2 SERPL-SCNC: 29 MMOL/L (ref 22–31)
CREAT BLD-MCNC: 1.2 MG/DL (ref 0.7–1.3)
DEPRECATED RDW RBC AUTO: 41.6 FL (ref 37–54)
EOSINOPHIL # BLD AUTO: 0.2 10*3/MM3 (ref 0–0.4)
EOSINOPHIL NFR BLD AUTO: 3.4 % (ref 0.3–6.2)
ERYTHROCYTE [DISTWIDTH] IN BLOOD BY AUTOMATED COUNT: 13.2 % (ref 12.3–15.4)
GFR SERPL CREATININE-BSD FRML MDRD: 64 ML/MIN/1.73 (ref 63–147)
GLOBULIN UR ELPH-MCNC: 3.1 GM/DL (ref 2.3–3.5)
GLUCOSE BLD-MCNC: 87 MG/DL (ref 60–100)
HCT VFR BLD AUTO: 39.2 % (ref 37.5–51)
HDLC SERPL-MCNC: 53 MG/DL (ref 60–200)
HGB BLD-MCNC: 13.1 G/DL (ref 13–17.7)
IMM GRANULOCYTES # BLD AUTO: 0.01 10*3/MM3 (ref 0–0.05)
IMM GRANULOCYTES NFR BLD AUTO: 0.2 % (ref 0–0.5)
LDLC/HDLC SERPL: 4.23 {RATIO} (ref 0–3.55)
LYMPHOCYTES # BLD AUTO: 2.18 10*3/MM3 (ref 0.7–3.1)
LYMPHOCYTES NFR BLD AUTO: 36.8 % (ref 19.6–45.3)
MCH RBC QN AUTO: 29.2 PG (ref 26.6–33)
MCHC RBC AUTO-ENTMCNC: 33.4 G/DL (ref 31.5–35.7)
MCV RBC AUTO: 87.5 FL (ref 79–97)
MONOCYTES # BLD AUTO: 0.53 10*3/MM3 (ref 0.1–0.9)
MONOCYTES NFR BLD AUTO: 9 % (ref 5–12)
NEUTROPHILS # BLD AUTO: 2.95 10*3/MM3 (ref 1.4–7)
NEUTROPHILS NFR BLD AUTO: 49.8 % (ref 42.7–76)
NRBC BLD AUTO-RTO: 0 /100 WBC (ref 0–0)
PLATELET # BLD AUTO: 313 10*3/MM3 (ref 140–450)
PMV BLD AUTO: 9.7 FL (ref 6–12)
POTASSIUM BLD-SCNC: 4.5 MMOL/L (ref 3.5–5.1)
PROT SERPL-MCNC: 7.7 G/DL (ref 6.3–8.6)
RBC # BLD AUTO: 4.48 10*6/MM3 (ref 4.14–5.8)
SODIUM BLD-SCNC: 138 MMOL/L (ref 137–145)
TRIGL SERPL-MCNC: 123 MG/DL (ref 20–199)
TSH SERPL DL<=0.05 MIU/L-ACNC: 1.58 MIU/ML (ref 0.46–4.68)
WBC NRBC COR # BLD: 5.92 10*3/MM3 (ref 3.4–10.8)

## 2019-03-13 PROCEDURE — 80061 LIPID PANEL: CPT | Performed by: NURSE PRACTITIONER

## 2019-03-13 PROCEDURE — 80053 COMPREHEN METABOLIC PANEL: CPT | Performed by: NURSE PRACTITIONER

## 2019-03-13 PROCEDURE — 99213 OFFICE O/P EST LOW 20 MIN: CPT | Performed by: NURSE PRACTITIONER

## 2019-03-13 PROCEDURE — 84443 ASSAY THYROID STIM HORMONE: CPT | Performed by: NURSE PRACTITIONER

## 2019-03-13 PROCEDURE — 85025 COMPLETE CBC W/AUTO DIFF WBC: CPT | Performed by: NURSE PRACTITIONER

## 2019-03-13 RX ORDER — QUETIAPINE FUMARATE 300 MG/1
300 TABLET, FILM COATED ORAL NIGHTLY
Qty: 90 TABLET | Refills: 1 | Status: SHIPPED | OUTPATIENT
Start: 2019-03-13 | End: 2019-06-24 | Stop reason: SDUPTHER

## 2019-03-13 RX ORDER — SIMVASTATIN 80 MG
80 TABLET ORAL NIGHTLY
Qty: 90 TABLET | Refills: 1 | Status: SHIPPED | OUTPATIENT
Start: 2019-03-13 | End: 2019-06-24 | Stop reason: SDUPTHER

## 2019-03-13 RX ORDER — ROPINIROLE 2 MG/1
2 TABLET, FILM COATED ORAL NIGHTLY
Qty: 90 TABLET | Refills: 1 | Status: SHIPPED | OUTPATIENT
Start: 2019-03-13 | End: 2019-06-24 | Stop reason: SDUPTHER

## 2019-03-13 NOTE — PROGRESS NOTES
Subjective   Ehsan Pena is a 48 y.o. male. Four month follow-up.      Fatigue   This is a chronic problem. The current episode started more than 1 year ago. The problem occurs daily. The problem has been gradually improving. Pertinent negatives include no chills, diaphoresis, fatigue, fever, swollen glands, urinary symptoms or visual change. The symptoms are aggravated by stress. He has tried rest for the symptoms. The treatment provided moderate relief.   Insomnia   This is a chronic problem. The current episode started more than 1 year ago. The problem occurs intermittently. The problem has been gradually improving. Pertinent negatives include no chills, diaphoresis, fatigue, fever, swollen glands, urinary symptoms or visual change. Nothing aggravates the symptoms. Treatments tried: seroquel  The treatment provided mild relief.   Hyperlipidemia   This is a chronic problem. The current episode started more than 1 year ago. The problem is controlled. There are no known factors aggravating his hyperlipidemia. Current antihyperlipidemic treatment includes statins. The current treatment provides moderate improvement of lipids. There are no compliance problems.  Risk factors for coronary artery disease include stress, dyslipidemia and male sex.   Lower Extremity Issue   This is a chronic problem. The current episode started more than 1 year ago. The problem occurs constantly. The problem has been resolved. Pertinent negatives include no chills, diaphoresis, fatigue, fever, swollen glands, urinary symptoms or visual change. Nothing aggravates the symptoms. Treatments tried: Requip. The treatment provided significant relief.        The following portions of the patient's history were reviewed and updated as appropriate:   Current Outpatient Medications   Medication Sig Dispense Refill   • QUEtiapine (SEROquel) 300 MG tablet Take 1 tablet by mouth Every Night. 90 tablet 1   • rOPINIRole (REQUIP) 2 MG tablet Take 1  tablet by mouth Every Night. 90 tablet 1   • simvastatin (ZOCOR) 80 MG tablet Take 1 tablet by mouth Every Night. 90 tablet 1     No current facility-administered medications for this visit.      Current Outpatient Medications on File Prior to Visit   Medication Sig   • [DISCONTINUED] QUEtiapine (SEROquel) 300 MG tablet Take 1 tablet by mouth Every Night.   • [DISCONTINUED] rOPINIRole (REQUIP) 2 MG tablet Take 1 tablet by mouth Every Night.   • [DISCONTINUED] simvastatin (ZOCOR) 80 MG tablet Take 1 tablet by mouth Every Night.     No current facility-administered medications on file prior to visit.      He is allergic to penicillins..    Review of Systems   Constitutional: Negative.  Negative for chills, diaphoresis, fatigue and fever.   Respiratory: Negative.    Cardiovascular: Negative.    Gastrointestinal: Negative.    Genitourinary: Negative.    Musculoskeletal: Negative.    Skin: Negative.    Neurological: Negative.    Psychiatric/Behavioral: Negative for confusion. The patient has insomnia.        Objective   Physical Exam   Constitutional: He is oriented to person, place, and time. He appears well-developed and well-nourished.   HENT:   Head: Normocephalic.   Right Ear: External ear normal.   Left Ear: External ear normal.   Eyes: EOM are normal. Pupils are equal, round, and reactive to light.   Neck: Normal range of motion. Neck supple.   Cardiovascular: Normal rate, regular rhythm and normal heart sounds.   Pulmonary/Chest: Effort normal and breath sounds normal.   Abdominal: Soft. Bowel sounds are normal.   Musculoskeletal: Normal range of motion.   Neurological: He is alert and oriented to person, place, and time.   Skin: Skin is warm. Capillary refill takes less than 2 seconds.   Psychiatric: He has a normal mood and affect. His behavior is normal.   Nursing note and vitals reviewed.      Assessment/Plan   Problems Addressed this Visit        Cardiovascular and Mediastinum    Mixed hyperlipidemia     Relevant Medications    simvastatin (ZOCOR) 80 MG tablet    Other Relevant Orders    Lipid panel       Other    Major depressive disorder, recurrent (CMS/HCC)    Relevant Medications    QUEtiapine (SEROquel) 300 MG tablet    Other Relevant Orders    CBC & Differential    Comprehensive Metabolic Panel    Restless leg    Relevant Medications    rOPINIRole (REQUIP) 2 MG tablet      Other Visit Diagnoses     Screening for hypothyroidism    -  Primary    Relevant Orders    TSH        1.  Mixed hyperlipidemia:  Continue on Zocor as previously prescribed and refill prescription sent to pharmacy  Complete fasting lipid panel as ordered and will notify of results when available  Encouraged to adhere to low-fat diet     2.  Sleep apnea:  Has been seen and evaluated by Dr. Lopez who recommended the use of the CPAP  He will also be referred to oral surgeon for a mask fitting     3.  Major depressive disorder, recurrent:  Continue on Seroquel as previously prescribed and refill prescription sent to pharmacy  Educated on possible side effects of this medication including but not limited to increased risk for suicidal/homicidal ideations  Encouraged to seek emergency medical treatment for any suicidal or homicidal ideations  Complete CBC and chemistry panel as ordered and will notify of results when available     4.  Restless leg:  Continue on Requip as previously prescribed and refill prescription sent to pharmacy  Educated on possible sedative side effects of this medication and encouraged not to take this medication prior to working or driving     5.  Screening for hypothyroidism:  Complete TSH as ordered and will notify of results when available    Continue on current medications as previously prescribed   Return in about 4 months (around 7/13/2019).      /    This document has been electronically signed by TRI Lanier on March 13, 2019 1:27 PM

## 2019-03-14 DIAGNOSIS — E78.2 MIXED HYPERLIPIDEMIA: Primary | ICD-10-CM

## 2019-03-14 RX ORDER — FENOFIBRATE 145 MG/1
145 TABLET, COATED ORAL DAILY
Qty: 30 TABLET | Refills: 5 | Status: SHIPPED | OUTPATIENT
Start: 2019-03-14 | End: 2019-06-24 | Stop reason: SDUPTHER

## 2019-03-15 ENCOUNTER — TELEPHONE (OUTPATIENT)
Dept: FAMILY MEDICINE CLINIC | Facility: CLINIC | Age: 49
End: 2019-03-15

## 2019-03-15 NOTE — TELEPHONE ENCOUNTER
Per TRI Ocampo, Mr. Pena has been called with recent lab results & recommendations.  Continue current medications and follow-up as planned or sooner if any problems.    ----- Message from TRI Lanier sent at 3/14/2019  7:49 AM CDT -----  Total cholesterol is now up to 302, bad cholesterol is at 197 in good cholesterol is too low at 53.  Zocor he is taking is currently at its maximum dose so I am going to have to add a secondary medication to tried to get his cholesterol under control.  I have sent in a prescription for a medication called TriCor.  He will take this once a day.  He needs to increase exercising like walking and he needs to adhere to a low-fat diet.   I will recheck his fasting labs in 3 months.  Thank you.

## 2019-03-15 NOTE — PROGRESS NOTES
Per TRI Ocampo, Mr. Pena has been called with recent lab results & recommendations.  Continue current medications and follow-up as planned or sooner if any problems.

## 2019-06-24 ENCOUNTER — TELEPHONE (OUTPATIENT)
Dept: FAMILY MEDICINE CLINIC | Facility: CLINIC | Age: 49
End: 2019-06-24

## 2019-06-24 DIAGNOSIS — G25.81 RESTLESS LEG: ICD-10-CM

## 2019-06-24 DIAGNOSIS — E78.2 MIXED HYPERLIPIDEMIA: ICD-10-CM

## 2019-06-24 DIAGNOSIS — F33.1 MODERATE EPISODE OF RECURRENT MAJOR DEPRESSIVE DISORDER (HCC): ICD-10-CM

## 2019-06-24 RX ORDER — SIMVASTATIN 80 MG
80 TABLET ORAL NIGHTLY
Qty: 90 TABLET | Refills: 1 | Status: SHIPPED | OUTPATIENT
Start: 2019-06-24 | End: 2020-03-16

## 2019-06-24 RX ORDER — ROPINIROLE 2 MG/1
2 TABLET, FILM COATED ORAL NIGHTLY
Qty: 90 TABLET | Refills: 1 | Status: SHIPPED | OUTPATIENT
Start: 2019-06-24 | End: 2020-03-16

## 2019-06-24 RX ORDER — QUETIAPINE FUMARATE 300 MG/1
300 TABLET, FILM COATED ORAL NIGHTLY
Qty: 90 TABLET | Refills: 1 | Status: SHIPPED | OUTPATIENT
Start: 2019-06-24 | End: 2021-04-06 | Stop reason: SDUPTHER

## 2019-06-24 RX ORDER — FENOFIBRATE 145 MG/1
145 TABLET, COATED ORAL DAILY
Qty: 30 TABLET | Refills: 5 | Status: SHIPPED | OUTPATIENT
Start: 2019-06-24 | End: 2019-11-07

## 2019-06-24 NOTE — TELEPHONE ENCOUNTER
Tried to call Thai and tell him meds can be picked up at [facility] but there was no answer and no vm set up.

## 2019-06-24 NOTE — TELEPHONE ENCOUNTER
Thai said his brother Tigre is acting out, spitting, kicking, and he is trying to get his medication bottles so he can get refills for what is needed.  Tigre will not cooperate with him so he is wanting to know if you can help him see what he should need refills for.      Thai [phone]

## 2019-11-07 ENCOUNTER — OFFICE VISIT (OUTPATIENT)
Dept: SLEEP MEDICINE | Facility: HOSPITAL | Age: 49
End: 2019-11-07

## 2019-11-07 VITALS
DIASTOLIC BLOOD PRESSURE: 93 MMHG | HEIGHT: 67 IN | SYSTOLIC BLOOD PRESSURE: 140 MMHG | HEART RATE: 97 BPM | OXYGEN SATURATION: 98 % | WEIGHT: 145.9 LBS | BODY MASS INDEX: 22.9 KG/M2

## 2019-11-07 DIAGNOSIS — Q05.9 SPINA BIFIDA, UNSPECIFIED HYDROCEPHALUS PRESENCE, UNSPECIFIED SPINAL REGION (HCC): ICD-10-CM

## 2019-11-07 DIAGNOSIS — G47.33 OBSTRUCTIVE SLEEP APNEA, ADULT: Primary | ICD-10-CM

## 2019-11-07 PROCEDURE — 99214 OFFICE O/P EST MOD 30 MIN: CPT | Performed by: INTERNAL MEDICINE

## 2019-11-09 NOTE — PROGRESS NOTES
Sleep Clinic Follow Up    Date: 2019  Primary Care Physician: Wilma Guerrero APRN    Last office visit: 2019 (I reviewed this note)    CC: Follow up: RADHA    Sleep Testin. PSG on 2015, AHI of 10.6   2. Currently on 5-12 cm H2O    Assessment and Plan:    1. Obstructive sleep apnea stable chronic illness and Established, worsening (2)  1. Less compliant but improved when using therapy.  2. Resume PAP as prescribed.   3. Script for PAP supplies  2. Insomnia stable chronic illness and Established, stable (1)  1. Stable on Seroquel 300 mg po qhs and Requip 2mg  3. RLS - stable  1. On Requip 2 mg   4. Autism - stable    Interim History:  Since the last visit:    1) mild RADHA -  Ehsan Pena reports compliance with CPAP. He denies mask and machine issues, dry mouth, headaches, or pressures intolerance. He denies abnormal dreams, sleep paralysis, nasal congestion, URI sx.    PAP Data:  DME: [facility]    Bed time unchanged from last visit    2) Patient RLS symptoms stable     PMHx, FH, SH reviewed and pertinent changes are: unchanged from last office visit (date above)      REVIEW OF SYSTEMS:   Negative for chest pain, fever, cough, SOA, abdominal pain. Smoking:none    Exam:  Vitals:    19 1536   BP: 140/93   Pulse: 97   SpO2: 98%           19  1536   Weight: 66.2 kg (145 lb 14.4 oz)     Body mass index is 22.85 kg/m². Patient's Body mass index is 22.85 kg/m². BMI is within normal parameters. No follow-up required..      Gen:  No acute distress, alert, oriented  Lungs:  CTA with normal effort   CV:  RRR, no M/R/G  GI:  soft, non-tender  Psych:  Appropriate affect    Past Medical History:   Diagnosis Date   • Dyspnea on exertion    • Fatigue    • Impulse control disorder    • Insomnia    • Major depressive disorder, recurrent (CMS/HCC)    • Restless legs    • Sleep apnea    • Spina bifida (CMS/HCC)        Current Outpatient Medications:   •  QUEtiapine (SEROquel) 300 MG tablet, Take 1 tablet  by mouth Every Night., Disp: 90 tablet, Rfl: 1  •  rOPINIRole (REQUIP) 2 MG tablet, Take 1 tablet by mouth Every Night., Disp: 90 tablet, Rfl: 1  •  simvastatin (ZOCOR) 80 MG tablet, Take 1 tablet by mouth Every Night., Disp: 90 tablet, Rfl: 1    Total visit time 20 min, with total of 10 minutes spent face-to-face counseling patient extensively regarding:   PAP therapy and Sleep hygiene       RTC in 12 months     This document has been electronically signed by Quinton Lopez MD on November 8, 2019         CC: Wilma Guerrero, APRN          No ref. provider found

## 2020-01-13 ENCOUNTER — HOSPITAL ENCOUNTER (OUTPATIENT)
Dept: SLEEP MEDICINE | Facility: HOSPITAL | Age: 50
Discharge: HOME | End: 2020-01-13
Admitting: INTERNAL MEDICINE

## 2020-01-13 DIAGNOSIS — G47.33 OBSTRUCTIVE SLEEP APNEA, ADULT: ICD-10-CM

## 2020-01-13 PROCEDURE — G0463 HOSPITAL OUTPT CLINIC VISIT: HCPCS

## 2020-01-13 NOTE — PROGRESS NOTES
Mr. Pena arrived to sleep center with his CPAP and his masks from home.  Upon removing CPAP from bag I noticed several dead bugs that appeared to be bed bugs.  Patient had no response other than he has not been using CPAP and it has been sitting by his recliner in the living room floor.   I removed his equipment from our test rooms and used our CPAP equipment for this visit.  Patient had several masks with him but was unable to explain why he couldn't use them other than they were uncomfortable.  I tried several different masks including his own with him on a pressure of 8 cmH2O. (pt on auto 5-12) Pt chose a quatro air full face mask that was his and he had brought in with him. He was shown several  full face masks, his own nasal mask, eson nasal mask, and pillows.  He preferred not to wear a chinstrap so liked quatro Air full face best. I showed him how to put the mask on properly with him doing in mirror and again without the mirror.  He had no issues putting on mask properly or getting it to seal.  He did however dislike pressure at 8 cm so it was turned down to five.  I think that Mr. Pena has all the knowledge and tools to succeed with CPAP so we discussed his need to use the equipment or he will never get acclaimated or be successful.

## 2020-02-12 ENCOUNTER — OFFICE VISIT (OUTPATIENT)
Dept: SLEEP MEDICINE | Facility: HOSPITAL | Age: 50
End: 2020-02-12

## 2020-02-12 VITALS
BODY MASS INDEX: 23.07 KG/M2 | DIASTOLIC BLOOD PRESSURE: 88 MMHG | SYSTOLIC BLOOD PRESSURE: 131 MMHG | HEIGHT: 67 IN | WEIGHT: 147 LBS | HEART RATE: 80 BPM | OXYGEN SATURATION: 97 %

## 2020-02-12 DIAGNOSIS — Q05.9 SPINA BIFIDA, UNSPECIFIED HYDROCEPHALUS PRESENCE, UNSPECIFIED SPINAL REGION (HCC): ICD-10-CM

## 2020-02-12 DIAGNOSIS — G47.33 OBSTRUCTIVE SLEEP APNEA, ADULT: Primary | ICD-10-CM

## 2020-02-12 DIAGNOSIS — F33.1 MODERATE EPISODE OF RECURRENT MAJOR DEPRESSIVE DISORDER (HCC): ICD-10-CM

## 2020-02-12 PROCEDURE — 99214 OFFICE O/P EST MOD 30 MIN: CPT | Performed by: INTERNAL MEDICINE

## 2020-02-12 NOTE — PROGRESS NOTES
Sleep Clinic Follow Up    Date: 2020  Primary Care Physician: Wilma Guerrero APRN    Last office visit: 2019 (I reviewed this note)    CC: Follow up: RADHA    Sleep Testin. PSG on 2015, AHI of 10.6   2. Currently on 5-12 cm H2O    Assessment and Plan:    1. Obstructive sleep apnea - stable chronic illness and Established, stable (1)  1. Compliant and improved with PAP therapy  2. Continue PAP as prescribed.   3. script for PAP supplies   4. compliance report  2. Insomnia - sleep onset and or maintenance stable chronic illness and Established, stable (1)   1. Continue Seroquel 300 milligrams by mouth at night  3. Restless leg syndrome stable chronic illness  1. Stable on Requip 2 mg nightly  4. Autism -stable    Interim History:  Since the last visit:    1) mild RADHA -  Ehsan Pena reports compliance with CPAP. He denies mask and machine issues, dry mouth, headaches, or pressures intolerance. He denies abnormal dreams, sleep paralysis, nasal congestion, URI sx. His SOA symptoms have improved, but he still complains of fatigue    PAP Data:  Mask type: Full face mask  DME: [facility]    Bed time: 0100  Sleep latency: 120 minutes  Number of times awakens during the night: 0-2  Wake time: 5148-1485  Estimated total sleep time at night: 8-12 hours  Caffeine intake: 4oz of coffee, 8oz of tea, and 24oz of soda  Alcohol intake: 0 drinks per week  Nap time: none   Sleepiness with Driving: none    [place] - 10    2) RLS - stable, symptoms controlled    PMHx, FH, SH reviewed and pertinent changes are: unchanged from last office visit (date above)      REVIEW OF SYSTEMS:   Negative for chest pain, fever, cough, SOA, abdominal pain. Smoking:none      Exam:  Vitals:    20 1631   BP: 131/88   Pulse: 80   SpO2: 97%           20  1631   Weight: 66.7 kg (147 lb)     Body mass index is 23.02 kg/m². Patient's Body mass index is 23.02 kg/m². BMI is within normal parameters. No follow-up  required..    Gen:  No acute distress, alert, oriented  Lungs:  CTA with normal effort   CV:  RRR, no M/R/G  GI:  soft, non-tender  Psych:  Appropriate affect    Past Medical History:   Diagnosis Date   • Dyspnea on exertion    • Fatigue    • Impulse control disorder    • Insomnia    • Major depressive disorder, recurrent (CMS/HCC)    • Restless legs    • Sleep apnea    • Spina bifida (CMS/HCC)        Current Outpatient Medications:   •  QUEtiapine (SEROquel) 300 MG tablet, Take 1 tablet by mouth Every Night., Disp: 90 tablet, Rfl: 1  •  rOPINIRole (REQUIP) 2 MG tablet, Take 1 tablet by mouth Every Night., Disp: 90 tablet, Rfl: 1  •  simvastatin (ZOCOR) 80 MG tablet, Take 1 tablet by mouth Every Night., Disp: 90 tablet, Rfl: 1    Total visit time 30 min, with total of 20 minutes spent face-to-face counseling patient extensively regarding:   PAP therapy and Medication changes       RTC in 12 months     This document has been electronically signed by Quinton Lopez MD on February 13, 2020         CC: Wilma Guerrero, TRI          No ref. provider found

## 2020-03-05 ENCOUNTER — OFFICE VISIT (OUTPATIENT)
Dept: FAMILY MEDICINE CLINIC | Facility: CLINIC | Age: 50
End: 2020-03-05

## 2020-03-05 VITALS
BODY MASS INDEX: 23.34 KG/M2 | DIASTOLIC BLOOD PRESSURE: 82 MMHG | WEIGHT: 148.7 LBS | SYSTOLIC BLOOD PRESSURE: 124 MMHG | HEIGHT: 67 IN

## 2020-03-05 DIAGNOSIS — Z13.29 SCREENING FOR HYPOTHYROIDISM: ICD-10-CM

## 2020-03-05 DIAGNOSIS — E78.5 HYPERLIPIDEMIA, UNSPECIFIED HYPERLIPIDEMIA TYPE: Primary | ICD-10-CM

## 2020-03-05 DIAGNOSIS — F33.1 MODERATE EPISODE OF RECURRENT MAJOR DEPRESSIVE DISORDER (HCC): ICD-10-CM

## 2020-03-05 DIAGNOSIS — Z12.11 SCREENING FOR COLON CANCER: ICD-10-CM

## 2020-03-05 DIAGNOSIS — Z13.220 SCREENING FOR HYPERCHOLESTEROLEMIA: ICD-10-CM

## 2020-03-05 PROCEDURE — 99215 OFFICE O/P EST HI 40 MIN: CPT | Performed by: NURSE PRACTITIONER

## 2020-03-05 NOTE — PROGRESS NOTES
"Subjective   Ehsan Pena is a 49 y.o. male.  Patient here for 3-month follow-up but has not been seen in this office in almost a year.  Escorted today by his brother who reports he has been bringing him \"to his appointment here so I do not know what he has been doing or where he has been going.\"  Patient becomes very upset at this time.  \"Meaning we just or you all the time.  He will take me to get my groceries when I need on.  He comes it may I come in him he comes that may accompany him.  Is just the cervical legal round and round and round.  If you do not get me why from him today on probably go to [place].  I am just telling you you got a get me out of the house.  I will just call the  in which called the police and tell him that he will take me to get anything or do anything.\"    Insomnia   This is a chronic problem. The current episode started more than 1 year ago. The problem occurs intermittently. The problem has been gradually improving. Nothing aggravates the symptoms. Treatments tried: seroquel  The treatment provided mild relief.   Hyperlipidemia   This is a chronic problem. The current episode started more than 1 year ago. The problem is controlled. There are no known factors aggravating his hyperlipidemia. Current antihyperlipidemic treatment includes statins. The current treatment provides moderate improvement of lipids. There are no compliance problems.  Risk factors for coronary artery disease include stress, dyslipidemia and male sex.   Depression   Visit Type: follow-up  Patient presents with the following symptoms: compulsions, decreased concentration, excessive worry, insomnia, irritability and nervousness/anxiety.  Patient is not experiencing: confusion, suicidal planning and thoughts of death.  Frequency of symptoms: constantly   Severity: severe   Sleep quality: non-restorative  Compliance with medications:  51-75%             The following portions of the patient's history " "were reviewed and updated as appropriate:   Current Outpatient Medications   Medication Sig Dispense Refill   • QUEtiapine (SEROquel) 300 MG tablet Take 1 tablet by mouth Every Night. 90 tablet 1   • rOPINIRole (REQUIP) 2 MG tablet Take 1 tablet by mouth Every Night. 90 tablet 1   • simvastatin (ZOCOR) 80 MG tablet Take 1 tablet by mouth Every Night. 90 tablet 1     No current facility-administered medications for this visit.      Current Outpatient Medications on File Prior to Visit   Medication Sig   • QUEtiapine (SEROquel) 300 MG tablet Take 1 tablet by mouth Every Night.   • rOPINIRole (REQUIP) 2 MG tablet Take 1 tablet by mouth Every Night.   • simvastatin (ZOCOR) 80 MG tablet Take 1 tablet by mouth Every Night.     No current facility-administered medications on file prior to visit.      He is allergic to fenofibrate and penicillins..    Review of Systems   Constitutional: Positive for irritability.   HENT: Negative.    Eyes: Negative.    Respiratory: Negative.    Cardiovascular: Negative.    Gastrointestinal: Negative.    Genitourinary: Negative.    Musculoskeletal: Negative.    Skin: Negative.    Neurological: Negative.    Psychiatric/Behavioral: Positive for decreased concentration. Negative for confusion. The patient is nervous/anxious and has insomnia.        Objective    Visit Vitals  /82   Ht 170.2 cm (67\")   Wt 67.4 kg (148 lb 11.2 oz)   BMI 23.29 kg/m²       Physical Exam   Constitutional: He is oriented to person, place, and time. He appears well-developed and well-nourished.   HENT:   Head: Normocephalic.   Right Ear: External ear normal.   Left Ear: External ear normal.   Eyes: Pupils are equal, round, and reactive to light. EOM are normal.   Neck: Normal range of motion. Neck supple.   Cardiovascular: Normal rate, regular rhythm and normal heart sounds.   Pulmonary/Chest: Effort normal and breath sounds normal.   Abdominal: Soft. Bowel sounds are normal.   Musculoskeletal: Normal range of " motion.   Neurological: He is alert and oriented to person, place, and time.   Skin: Skin is warm. Capillary refill takes less than 2 seconds.   Psychiatric: He has a normal mood and affect. His behavior is normal.   Nursing note and vitals reviewed.      Assessment/Plan   Problems Addressed this Visit        Other    Major depressive disorder, recurrent (CMS/HCC)    Relevant Orders    CBC & Differential    Comprehensive Metabolic Panel      Other Visit Diagnoses     Hyperlipidemia, unspecified hyperlipidemia type    -  Primary    Screening for hypercholesterolemia        Relevant Orders    Lipid panel    Screening for hypothyroidism        Relevant Orders    TSH    Screening for colon cancer        Relevant Orders    Ambulatory Referral For Screening Colonoscopy      No orders of the defined types were placed in this encounter.    1.  Major depressive disorder:  Complete CBC and chemistry panel as ordered and will notify results when available  Continue on Seroquel as previously prescribed  Strongly encouraged to keep upcoming scheduled appointment with [facility]    2.  Hyperlipidemia:  Encouraged to continue on Zocor as previously prescribed  Encouraged adhere to low-fat diet  Try to discuss ways to reduce saturated fats in diet but patient was upset at this time  Complete fasting lipid panel as ordered and will notify results when available    3.  Screening for hypothyroidism:  Complete TSH as ordered and will notify results when available    4.  Screening for colon cancer:  Referral placed for screening colonoscopy and will call to schedule appointment    Patient kept becoming increasingly upset as the encounter went on.  Family asked his brother to step out of the room and spoke with Ehsan one-on-one.  He is adamant that if I do not call the police or  and have someone come talk to him that he is going to hurt his brother.  When evan asked if he had guns in the house his  "response was \"no but I know where to get some.\"  This took place at approximately 3:30 in the afternoon.  SURINDERD notified and officer sent to speak with Mr. Pena.  His brother was escorted to the lobby to help de-escalate tensions.  After speaking with the police Mr. Pena abruptly left the room and refused to let his brother drive him home.  To call from this office walking on foot.  PHAN reported they would send an officer by his residence to check on him shortly.  Strongly encouraged his brother to contact [facility] and explained to them what is been going on so that hopefully he can seek the proper psychiatric treatment.  Encouraged to contact police if any further threats were received.    Continue on current medications as previously prescribed   I spent 42 minutes in direct face to face contact with patient.  Greater than 50% of this time was spent counseling patient and discussing plan of care.  Return in about 4 weeks (around 4/2/2020) for Recheck.        This document has been electronically signed by TRI Lanier on March 6, 2020 3:11 PM            "

## 2020-03-16 DIAGNOSIS — E78.2 MIXED HYPERLIPIDEMIA: ICD-10-CM

## 2020-03-16 DIAGNOSIS — G25.81 RESTLESS LEG: ICD-10-CM

## 2020-03-16 RX ORDER — SIMVASTATIN 80 MG
80 TABLET ORAL NIGHTLY
Qty: 90 TABLET | Refills: 1 | Status: SHIPPED | OUTPATIENT
Start: 2020-03-16 | End: 2021-04-06 | Stop reason: SINTOL

## 2020-03-16 RX ORDER — ROPINIROLE 2 MG/1
2 TABLET, FILM COATED ORAL NIGHTLY
Qty: 90 TABLET | Refills: 1 | Status: SHIPPED | OUTPATIENT
Start: 2020-03-16 | End: 2020-10-20 | Stop reason: SDUPTHER

## 2020-05-27 ENCOUNTER — DOCUMENTATION (OUTPATIENT)
Dept: SLEEP MEDICINE | Facility: HOSPITAL | Age: 50
End: 2020-05-27

## 2020-10-20 DIAGNOSIS — G25.81 RESTLESS LEG: ICD-10-CM

## 2020-10-20 NOTE — TELEPHONE ENCOUNTER
Caller: Ehsan Pena    Relationship: Self    Best call back number: [phone]     Medication needed:   Requested Prescriptions     Pending Prescriptions Disp Refills   • rOPINIRole (REQUIP) 2 MG tablet 90 tablet 1     Sig: Take 1 tablet by mouth Every Night.       When do you need the refill by: 10/20/2020    What details did the patient provide when requesting the medication:     Does the patient have less than a 3 day supply:  [x] Yes  [] No    What is the patient's preferred pharmacy: [id number] [address] [place] - [phone] [facility] [phone] FX

## 2020-10-21 RX ORDER — ROPINIROLE 2 MG/1
2 TABLET, FILM COATED ORAL NIGHTLY
Qty: 90 TABLET | Refills: 1 | Status: SHIPPED | OUTPATIENT
Start: 2020-10-21 | End: 2021-04-06 | Stop reason: SDUPTHER

## 2020-12-29 ENCOUNTER — TELEPHONE (OUTPATIENT)
Dept: FAMILY MEDICINE CLINIC | Facility: CLINIC | Age: 50
End: 2020-12-29

## 2021-04-06 ENCOUNTER — OFFICE VISIT (OUTPATIENT)
Dept: FAMILY MEDICINE CLINIC | Facility: CLINIC | Age: 51
End: 2021-04-06

## 2021-04-06 VITALS
WEIGHT: 140.2 LBS | SYSTOLIC BLOOD PRESSURE: 136 MMHG | HEIGHT: 67 IN | BODY MASS INDEX: 22 KG/M2 | DIASTOLIC BLOOD PRESSURE: 88 MMHG

## 2021-04-06 DIAGNOSIS — Z11.59 NEED FOR HEPATITIS C SCREENING TEST: ICD-10-CM

## 2021-04-06 DIAGNOSIS — E78.2 MIXED HYPERLIPIDEMIA: ICD-10-CM

## 2021-04-06 DIAGNOSIS — G25.81 RESTLESS LEG: ICD-10-CM

## 2021-04-06 DIAGNOSIS — F33.1 MODERATE EPISODE OF RECURRENT MAJOR DEPRESSIVE DISORDER (HCC): ICD-10-CM

## 2021-04-06 DIAGNOSIS — R53.82 CHRONIC FATIGUE: Primary | ICD-10-CM

## 2021-04-06 PROCEDURE — 99214 OFFICE O/P EST MOD 30 MIN: CPT | Performed by: NURSE PRACTITIONER

## 2021-04-06 RX ORDER — COLESEVELAM 180 1/1
1875 TABLET ORAL 2 TIMES DAILY WITH MEALS
Qty: 60 TABLET | Refills: 3 | Status: SHIPPED | OUTPATIENT
Start: 2021-04-06 | End: 2021-05-25

## 2021-04-06 RX ORDER — ROPINIROLE 2 MG/1
2 TABLET, FILM COATED ORAL NIGHTLY
Qty: 90 TABLET | Refills: 1 | Status: SHIPPED | OUTPATIENT
Start: 2021-04-06 | End: 2021-07-01

## 2021-04-06 RX ORDER — QUETIAPINE FUMARATE 300 MG/1
300 TABLET, FILM COATED ORAL NIGHTLY
Qty: 90 TABLET | Refills: 1 | Status: SHIPPED | OUTPATIENT
Start: 2021-04-06 | End: 2021-09-30

## 2021-04-06 NOTE — PROGRESS NOTES
"Subjective   Ehsan Pena is a 50 y.o. male.  For the past 2 weeks has been complaining of a sore throat with sinus drainage.  \"It was really bad not too long ago but I feel much better in the last 2 days.  Now here just really complaining of feeling tired a lot.  I am just really fatigued.  I do have a CPAP but I stopped using it.  That did help when I used it.  Also I could not take those cholesterol pill she gave me.  They cause my face to swell.\"  Patient was prescribed pravastatin.  Needs a refill of his Seroquel and Requip.    Fatigue  This is a chronic problem. The current episode started 1 to 4 weeks ago. The problem occurs constantly. The problem has been gradually worsening. Associated symptoms include fatigue. Pertinent negatives include no abdominal pain, chest pain, chills, coughing, diaphoresis, fever, myalgias, nausea, sore throat or weakness. Nothing aggravates the symptoms. He has tried rest and relaxation for the symptoms. The treatment provided no relief.   Hyperlipidemia  This is a chronic problem. The current episode started more than 1 year ago. The problem is uncontrolled. Factors aggravating his hyperlipidemia include fatty foods. Pertinent negatives include no chest pain, leg pain, myalgias or shortness of breath. He is currently on no antihyperlipidemic treatment. The current treatment provides no improvement of lipids. Compliance problems include adherence to diet, adherence to exercise, psychosocial issues and medication side effects.  Risk factors for coronary artery disease include family history, male sex and stress.        The following portions of the patient's history were reviewed and updated as appropriate:     Current Outpatient Medications   Medication Sig Dispense Refill   • QUEtiapine (SEROquel) 300 MG tablet Take 1 tablet by mouth Every Night. 90 tablet 1   • rOPINIRole (REQUIP) 2 MG tablet Take 1 tablet by mouth Every Night. 90 tablet 1   • colesevelam (Welchol) 625 MG " "tablet Take 3 tablets by mouth 2 (Two) Times a Day With Meals. 60 tablet 3     No current facility-administered medications for this visit.     Current Outpatient Medications on File Prior to Visit   Medication Sig   • [DISCONTINUED] QUEtiapine (SEROquel) 300 MG tablet Take 1 tablet by mouth Every Night.   • [DISCONTINUED] rOPINIRole (REQUIP) 2 MG tablet Take 1 tablet by mouth Every Night.   • [DISCONTINUED] simvastatin (ZOCOR) 80 MG tablet TAKE 1 TABLET BY MOUTH EVERY NIGHT     No current facility-administered medications on file prior to visit.     He is allergic to simvastatin, fenofibrate, and penicillins..    Review of Systems   Constitutional: Positive for fatigue. Negative for chills, diaphoresis and fever.   HENT: Negative.  Negative for sore throat.    Respiratory: Negative.  Negative for cough, chest tightness and shortness of breath.    Cardiovascular: Negative.  Negative for chest pain.   Gastrointestinal: Negative.  Negative for abdominal pain, diarrhea and nausea.   Musculoskeletal: Negative.  Negative for myalgias.   Skin: Negative.    Neurological: Negative.  Negative for dizziness, syncope and weakness.   Psychiatric/Behavioral: Negative.  Negative for confusion and decreased concentration.       Objective    Visit Vitals  /88   Ht 170.2 cm (67\")   Wt 63.6 kg (140 lb 3.2 oz)   BMI 21.96 kg/m²       Physical Exam  Vitals and nursing note reviewed.   Constitutional:       Appearance: He is well-developed.   HENT:      Head: Normocephalic.      Right Ear: External ear normal.      Left Ear: External ear normal.   Eyes:      Pupils: Pupils are equal, round, and reactive to light.   Cardiovascular:      Rate and Rhythm: Normal rate and regular rhythm.      Heart sounds: Normal heart sounds.   Pulmonary:      Effort: Pulmonary effort is normal.      Breath sounds: Normal breath sounds.   Abdominal:      General: Bowel sounds are normal.      Palpations: Abdomen is soft.   Musculoskeletal:         " General: Normal range of motion.      Cervical back: Normal range of motion and neck supple.   Skin:     General: Skin is warm.      Capillary Refill: Capillary refill takes less than 2 seconds.   Neurological:      Mental Status: He is alert and oriented to person, place, and time.   Psychiatric:         Mood and Affect: Mood is anxious.         Behavior: Behavior normal.         Assessment/Plan   Diagnoses and all orders for this visit:    1. Chronic fatigue (Primary)  -     CBC & Differential; Future  -     Comprehensive Metabolic Panel; Future  -     TSH; Future  -     Vitamin D 25 Hydroxy; Future  -     Vitamin B12; Future    2. Mixed hyperlipidemia  -     colesevelam (Welchol) 625 MG tablet; Take 3 tablets by mouth 2 (Two) Times a Day With Meals.  Dispense: 60 tablet; Refill: 3  -     Lipid Panel; Future    3. Moderate episode of recurrent major depressive disorder (CMS/HCC)  -     QUEtiapine (SEROquel) 300 MG tablet; Take 1 tablet by mouth Every Night.  Dispense: 90 tablet; Refill: 1    4. Restless leg  -     rOPINIRole (REQUIP) 2 MG tablet; Take 1 tablet by mouth Every Night.  Dispense: 90 tablet; Refill: 1    5. Need for hepatitis C screening test  -     Hepatitis C antibody; Future      Continue on current medications as previously prescribed and refill prescriptions for Seroquel and Requip sent to pharmacy  Complete fasting lab work as ordered and will notify of results when available  Discontinue pravastatin as previously prescribed due to side effects  Begin WelChol as prescribed  Educated on possible side effects of this medication  Encouraged adhere to a low-fat diet  Discussed importance of regular exercise regimen on helping improve good cholesterol levels  Discussed importance of resuming use of CPAP nightly  Educated on importance of a minimum of 8 hours of sleep per night to help reduce feelings of daytime fatigue  Return in about 7 weeks (around 5/25/2021) for Annual physical.        This  document has been electronically signed by TRI Lanier on April 6, 2021 12:26 CDT

## 2021-04-09 ENCOUNTER — LAB (OUTPATIENT)
Dept: LAB | Facility: HOSPITAL | Age: 51
End: 2021-04-09

## 2021-05-25 ENCOUNTER — LAB (OUTPATIENT)
Dept: LAB | Facility: HOSPITAL | Age: 51
End: 2021-05-25

## 2021-05-25 ENCOUNTER — OFFICE VISIT (OUTPATIENT)
Dept: FAMILY MEDICINE CLINIC | Facility: CLINIC | Age: 51
End: 2021-05-25

## 2021-05-25 VITALS
DIASTOLIC BLOOD PRESSURE: 84 MMHG | SYSTOLIC BLOOD PRESSURE: 124 MMHG | BODY MASS INDEX: 23.07 KG/M2 | HEIGHT: 67 IN | WEIGHT: 147 LBS

## 2021-05-25 DIAGNOSIS — F33.1 MODERATE EPISODE OF RECURRENT MAJOR DEPRESSIVE DISORDER (HCC): ICD-10-CM

## 2021-05-25 DIAGNOSIS — R53.82 CHRONIC FATIGUE: ICD-10-CM

## 2021-05-25 DIAGNOSIS — Z00.00 ANNUAL PHYSICAL EXAM: Primary | ICD-10-CM

## 2021-05-25 DIAGNOSIS — E78.2 MIXED HYPERLIPIDEMIA: ICD-10-CM

## 2021-05-25 DIAGNOSIS — Z11.59 NEED FOR HEPATITIS C SCREENING TEST: ICD-10-CM

## 2021-05-25 DIAGNOSIS — Z12.11 SCREENING FOR COLON CANCER: ICD-10-CM

## 2021-05-25 DIAGNOSIS — Z23 NEED FOR SHINGLES VACCINE: ICD-10-CM

## 2021-05-25 DIAGNOSIS — Z12.5 SCREENING FOR MALIGNANT NEOPLASM OF PROSTATE: ICD-10-CM

## 2021-05-25 LAB
25(OH)D3 SERPL-MCNC: 18.8 NG/ML (ref 30–100)
ALBUMIN SERPL-MCNC: 4.5 G/DL (ref 3.5–5.2)
ALBUMIN/GLOB SERPL: 1.6 G/DL
ALP SERPL-CCNC: 63 U/L (ref 39–117)
ALT SERPL W P-5'-P-CCNC: 21 U/L (ref 1–41)
ANION GAP SERPL CALCULATED.3IONS-SCNC: 9.4 MMOL/L (ref 5–15)
AST SERPL-CCNC: 18 U/L (ref 1–40)
BILIRUB SERPL-MCNC: 0.2 MG/DL (ref 0–1.2)
BUN SERPL-MCNC: 23 MG/DL (ref 6–20)
BUN/CREAT SERPL: 23.2 (ref 7–25)
CALCIUM SPEC-SCNC: 9.8 MG/DL (ref 8.6–10.5)
CHLORIDE SERPL-SCNC: 107 MMOL/L (ref 98–107)
CHOLEST SERPL-MCNC: 274 MG/DL (ref 0–200)
CO2 SERPL-SCNC: 25.6 MMOL/L (ref 22–29)
CREAT SERPL-MCNC: 0.99 MG/DL (ref 0.76–1.27)
GFR SERPL CREATININE-BSD FRML MDRD: 80 ML/MIN/1.73
GLOBULIN UR ELPH-MCNC: 2.8 GM/DL
GLUCOSE SERPL-MCNC: 95 MG/DL (ref 65–99)
HCV AB SER DONR QL: NORMAL
HDLC SERPL-MCNC: 56 MG/DL (ref 40–60)
LDLC SERPL CALC-MCNC: 186 MG/DL (ref 0–100)
LDLC/HDLC SERPL: 3.27 {RATIO}
POTASSIUM SERPL-SCNC: 4.4 MMOL/L (ref 3.5–5.2)
PROT SERPL-MCNC: 7.3 G/DL (ref 6–8.5)
PSA SERPL-MCNC: 1.19 NG/ML (ref 0–4)
SODIUM SERPL-SCNC: 142 MMOL/L (ref 136–145)
TRIGL SERPL-MCNC: 175 MG/DL (ref 0–150)
TSH SERPL DL<=0.05 MIU/L-ACNC: 2.76 UIU/ML (ref 0.27–4.2)
VIT B12 BLD-MCNC: 315 PG/ML (ref 211–946)
VLDLC SERPL-MCNC: 32 MG/DL (ref 5–40)

## 2021-05-25 PROCEDURE — 80053 COMPREHEN METABOLIC PANEL: CPT

## 2021-05-25 PROCEDURE — 90750 HZV VACC RECOMBINANT IM: CPT | Performed by: NURSE PRACTITIONER

## 2021-05-25 PROCEDURE — 80061 LIPID PANEL: CPT

## 2021-05-25 PROCEDURE — 82306 VITAMIN D 25 HYDROXY: CPT

## 2021-05-25 PROCEDURE — 84443 ASSAY THYROID STIM HORMONE: CPT

## 2021-05-25 PROCEDURE — 99396 PREV VISIT EST AGE 40-64: CPT | Performed by: NURSE PRACTITIONER

## 2021-05-25 PROCEDURE — 86803 HEPATITIS C AB TEST: CPT

## 2021-05-25 PROCEDURE — 82607 VITAMIN B-12: CPT

## 2021-05-25 PROCEDURE — G0103 PSA SCREENING: HCPCS

## 2021-05-25 PROCEDURE — 36415 COLL VENOUS BLD VENIPUNCTURE: CPT

## 2021-05-25 NOTE — PROGRESS NOTES
"Chief Complaint  Annual Exam    Subjective          Ehsan Pena presents to [facility]  Hyperlipidemia  This is a chronic problem. The current episode started more than 1 year ago. The problem is controlled. There are no known factors aggravating his hyperlipidemia. Current antihyperlipidemic treatment includes statins. The current treatment provides moderate improvement of lipids. There are no compliance problems.  Risk factors for coronary artery disease include stress, dyslipidemia and male sex.   Depression  Visit Type: follow-up  Patient presents with the following symptoms: compulsions, decreased concentration, excessive worry, irritability and nervousness/anxiety.  Patient is not experiencing: confusion, suicidal planning and thoughts of death.  Frequency of symptoms: constantly   Severity: severe   Sleep quality: non-restorative  Compliance with medications:  51-75%            Objective   Vital Signs:    /84   Ht 170.2 cm (67\")   Wt 66.7 kg (147 lb)   BMI 23.02 kg/m²     Physical Exam  Vitals and nursing note reviewed.   Constitutional:       Appearance: Normal appearance. He is well-developed and normal weight.   HENT:      Head: Normocephalic and atraumatic.      Right Ear: Tympanic membrane, ear canal and external ear normal.      Left Ear: Tympanic membrane, ear canal and external ear normal.      Nose: Nose normal.      Mouth/Throat:      Mouth: Mucous membranes are moist.      Pharynx: Oropharynx is clear.   Eyes:      Extraocular Movements: Extraocular movements intact.      Conjunctiva/sclera: Conjunctivae normal.      Pupils: Pupils are equal, round, and reactive to light.   Cardiovascular:      Rate and Rhythm: Normal rate and regular rhythm.      Pulses: Normal pulses.      Heart sounds: Normal heart sounds.   Pulmonary:      Effort: Pulmonary effort is normal.      Breath sounds: Normal breath sounds.   Abdominal:      General: Bowel sounds are normal.    "   Palpations: Abdomen is soft.   Musculoskeletal:         General: Normal range of motion.      Cervical back: Normal range of motion and neck supple.   Skin:     General: Skin is warm.      Capillary Refill: Capillary refill takes less than 2 seconds.   Neurological:      Mental Status: He is alert and oriented to person, place, and time.   Psychiatric:         Behavior: Behavior normal.        Result Review :                   Assessment and Plan    Diagnoses and all orders for this visit:    1. Annual physical exam (Primary)    2. Mixed hyperlipidemia    3. Moderate episode of recurrent major depressive disorder (CMS/HCC)    4. Need for shingles vaccine  -     Shingrix Vaccine    5. Screening for malignant neoplasm of prostate  -     PSA Screen; Future    6. Screening for colon cancer  -     Ambulatory Referral For Screening Colonoscopy      Continue on current medications as previously prescribed   Complete fasting lab work as previously ordered and will notify of results when available   Encouraged adhere to a low-fat diet  Continue psychiatric follow-up with [facility] as scheduled  Shingrix vaccine given IM in office  Educated on possible side effects of this medication including not limited to increased risk for pain, swelling and redness of injection site  Educated on importance of completing second dose of vaccine therapy in no sooner than 2 months but no greater than 6 months  Referral placed for screening colonoscopy will call to schedule appointment  Counseled on importance of healthy eating habits and regular physical activity regimen on improving overall physical and mental health  I spent 26 minutes caring for Ehsan on this date of service. This time includes time spent by me in the following activities:preparing for the visit, reviewing tests, obtaining and/or reviewing a separately obtained history, performing a medically appropriate examination and/or evaluation , counseling and  educating the patient/family/caregiver, ordering medications, tests, or procedures, documenting information in the medical record and care coordination  Follow Up   Return in about 6 months (around 11/25/2021) for Recheck.  Patient was given instructions and counseling regarding his condition or for health maintenance advice. Please see specific information pulled into the AVS if appropriate.         This document has been electronically signed by TRI Lanier on May 25, 2021 08:46 CDT

## 2021-05-28 ENCOUNTER — TELEPHONE (OUTPATIENT)
Dept: FAMILY MEDICINE CLINIC | Facility: CLINIC | Age: 51
End: 2021-05-28

## 2021-05-28 DIAGNOSIS — E55.9 VITAMIN D DEFICIENCY: Primary | ICD-10-CM

## 2021-05-28 RX ORDER — ERGOCALCIFEROL 1.25 MG/1
50000 CAPSULE ORAL
Qty: 12 CAPSULE | Refills: 3 | Status: SHIPPED | OUTPATIENT
Start: 2021-05-28

## 2021-05-28 NOTE — PROGRESS NOTES
Per TRI Dillon, Mr. Pena's family has been called with recent lab results & recommendations.  Continue current medications and follow-up as planned or sooner if any problems.

## 2021-05-28 NOTE — TELEPHONE ENCOUNTER
Per TRI Dillon, Mr. Pena's family has been called with recent lab results & recommendations.  Continue current medications and follow-up as planned or sooner if any problems.       ----- Message from TRI Lanier sent at 5/28/2021  2:27 PM CDT -----  Sugar, sodium, potassium, kidney function, liver function, thyroid and prostate antigen were within normal limits.  Energy level is low.  I have sent in a prescription vitamin D to his pharmacy.  He will take this once weekly.  Cholesterol levels are still elevated but total cholesterol has improved however triglycerides have increased.  Bad cholesterol remains about the same.  He needs to adhere to a low-fat diet and increase exercises such as walking.  Oats have also been shown to help reduce cholesterol levels so consuming a bowl of oatmeal, Cheerios or honey bunches of oats at breakfast would also help.  All other labs were essentially normal.

## 2021-06-21 ENCOUNTER — OFFICE VISIT (OUTPATIENT)
Dept: GASTROENTEROLOGY | Facility: CLINIC | Age: 51
End: 2021-06-21

## 2021-06-21 VITALS
SYSTOLIC BLOOD PRESSURE: 122 MMHG | DIASTOLIC BLOOD PRESSURE: 84 MMHG | HEART RATE: 87 BPM | WEIGHT: 152.8 LBS | BODY MASS INDEX: 23.98 KG/M2 | HEIGHT: 67 IN

## 2021-06-21 DIAGNOSIS — Z12.11 ENCOUNTER FOR SCREENING FOR MALIGNANT NEOPLASM OF COLON: Primary | ICD-10-CM

## 2021-06-21 PROCEDURE — S0260 H&P FOR SURGERY: HCPCS | Performed by: NURSE PRACTITIONER

## 2021-06-21 RX ORDER — PEG-3350, SODIUM SULFATE, SODIUM CHLORIDE, POTASSIUM CHLORIDE, SODIUM ASCORBATE AND ASCORBIC ACID 7.5-2.691G
1000 KIT ORAL EVERY 12 HOURS
Qty: 1000 ML | Refills: 0 | Status: SHIPPED | OUTPATIENT
Start: 2021-06-21 | End: 2021-07-13 | Stop reason: HOSPADM

## 2021-06-21 RX ORDER — DEXTROSE AND SODIUM CHLORIDE 5; .45 G/100ML; G/100ML
30 INJECTION, SOLUTION INTRAVENOUS CONTINUOUS PRN
Status: CANCELLED | OUTPATIENT
Start: 2021-07-13

## 2021-06-21 NOTE — PATIENT INSTRUCTIONS
Colorectal Cancer Screening    Colorectal cancer screening is a group of tests that are used to check for colorectal cancer before symptoms develop. Colorectal refers to the colon and rectum. The colon and rectum are located at the end of the digestive tract and carry bowel movements out of the body.  Who should have screening?  All adults starting at age 50 until age 75 should have screening. Your health care provider may recommend screening at age 45. You will have tests every 1-10 years, depending on your results and the type of screening test.  You may have screening tests starting at an earlier age, or more frequently than other people, if you have any of the following risk factors:  · A personal or family history of colorectal cancer or abnormal growths (polyps).  · Inflammatory bowel disease, such as ulcerative colitis or Crohn's disease.  · A history of having radiation treatment to the abdomen or pelvic area for cancer.  · Colorectal cancer symptoms, such as changes in bowel habits or blood in your stool.  · A type of colon cancer syndrome that is passed from parent to child (hereditary), such as:  ? Girard syndrome.  ? Familial adenomatous polyposis.  ? Turcot syndrome.  ? Peutz-Jeghers syndrome.  Screening recommendations for adults who are 75-85 years old vary depending on health.  How is screening done?  There are several types of colorectal screening tests. You may have one or more of the following:  · Guaiac-based fecal occult blood testing. For this test, a stool (feces) sample is checked for hidden (occult) blood, which could be a sign of colorectal cancer.  · Fecal immunochemical test (FIT). For this test, a stool sample is checked for blood, which could be a sign of colorectal cancer.  · Stool DNA test. For this test, a stool sample is checked for blood and changes in DNA that could lead to colorectal cancer.  · Sigmoidoscopy. During this test, a thin, flexible tube with a camera on the end  (sigmoidoscope) is used to examine the rectum and the lower colon.  · Colonoscopy. During this test, a long, flexible tube with a camera on the end (colonoscope) is used to examine the entire colon and rectum. With a colonoscopy, it is possible to take a sample of tissue (biopsy) and remove small polyps during the test.  · Virtual colonoscopy. Instead of a colonoscope, this type of colonoscopy uses X-rays (CT scan) and computers to produce images of the colon and rectum.  What are the benefits of screening?  Screening reduces your risk for colorectal cancer and can help identify cancer at an early stage, when the cancer can be removed or treated more easily. It is common for polyps to form in the lining of the colon, especially as you age. These polyps may be cancerous or become cancerous over time. Screening can identify these polyps.  What are the risks of screening?  Each screening test may have different risks.  · Stool sample tests have fewer risks than other types of screening tests. However, you may need more tests to confirm results from a stool sample test.  · Screening tests that involve X-rays expose you to low levels of radiation, which may slightly increase your cancer risk. The benefit of detecting cancer outweighs the slight increase in risk.  · Screening tests such as sigmoidoscopy and colonoscopy may place you at risk for bleeding, intestinal damage, infection, or a reaction to medicines given during the exam.  Talk with your health care provider to understand your risk for colorectal cancer and to make a screening plan that is right for you.  Questions to ask your health care provider  · When should I start colorectal cancer screening?  · What is my risk for colorectal cancer?  · How often do I need screening?  · Which screening tests do I need?  · How do I get my test results?  · What do my results mean?  Where to find more information  Learn more about colorectal cancer screening from:  · The  American Cancer Society: www.cancer.org  · The National Cancer [place]: www.cancer.gov  Summary  · Colorectal cancer screening is a group of tests used to check for colorectal cancer before symptoms develop.  · Screening reduces your risk for colorectal cancer and can help identify cancer at an early stage, when the cancer can be removed or treated more easily.  · All adults starting at age 50 until age 75 should have screening. Your health care provider may recommend screening at age 45.  · You may have screening tests starting at an earlier age, or more frequently than other people, if you have certain risk factors.  · Talk with your health care provider to understand your risk for colorectal cancer and to make a screening plan that is right for you.  This information is not intended to replace advice given to you by your health care provider. Make sure you discuss any questions you have with your health care provider.  Document Revised: 04/08/2020 Document Reviewed: 09/19/2018  Elsevier Patient Education © 2021 [employer]

## 2021-06-21 NOTE — PROGRESS NOTES
Chief Complaint   Patient presents with   • Colon Cancer Screening       Subjective    Ehsan Pena is a 50 y.o. male. he is being seen for consultation today at the request of TRI Haji    History of Present Illness    50-year-old male presents to discuss screening colonoscopy.  Denies any abdominal pain nausea vomiting or change with his bowel habits.  Denies any family history of colorectal cancer or polyps.  Patient reports he had normal colonoscopy in 2008 in [facility] unable to review that report.    Plan; schedule patient for screening colonoscopy.     The following portions of the patient's history were reviewed and updated as appropriate:   Past Medical History:   Diagnosis Date   • Dyspnea on exertion    • Fatigue    • Impulse control disorder    • Insomnia    • Major depressive disorder, recurrent (CMS/HCC)    • Restless legs    • Sleep apnea    • Spina bifida (CMS/HCC)      Past Surgical History:   Procedure Laterality Date   • INJECTION OF MEDICATION  10/12/2015    KENALOG(1)     Family History   Problem Relation Age of Onset   • Stroke Mother    • Heart disease Father    • Diabetes Maternal Grandmother        Prior to Admission medications    Medication Sig Start Date End Date Taking? Authorizing Provider   QUEtiapine (SEROquel) 300 MG tablet Take 1 tablet by mouth Every Night. 4/6/21  Yes Wlima Guerrero APRN   rOPINIRole (REQUIP) 2 MG tablet Take 1 tablet by mouth Every Night. 4/6/21  Yes Wilma Guerrero APRN   vitamin D (ERGOCALCIFEROL) 1.25 MG (21083 UT) capsule capsule Take 1 capsule by mouth Every 7 (Seven) Days. 5/28/21  Yes Wilma Guerrero APRN     Allergies   Allergen Reactions   • Simvastatin Swelling   • Fenofibrate Swelling     Facial swelling   • Penicillins      Social History     Socioeconomic History   • Marital status: Single     Spouse name: Not on file   • Number of children: Not on file   • Years of education: Not on file   • Highest education level: Not on file  "  Tobacco Use   • Smoking status: Never Smoker   • Smokeless tobacco: Never Used   Vaping Use   • Vaping Use: Never used   Substance and Sexual Activity   • Alcohol use: No   • Drug use: No       Review of Systems  Review of Systems   Constitutional: Negative for activity change, appetite change, chills, diaphoresis, fatigue, fever and unexpected weight change.   HENT: Negative for sore throat and trouble swallowing.    Respiratory: Negative for shortness of breath.    Gastrointestinal: Negative for abdominal distention, abdominal pain, anal bleeding, blood in stool, constipation, diarrhea, nausea, rectal pain and vomiting.   Musculoskeletal: Negative for arthralgias.   Skin: Negative for pallor.   Neurological: Negative for light-headedness.        /84 (BP Location: Left arm)   Pulse 87   Ht 170.2 cm (67\")   Wt 69.3 kg (152 lb 12.8 oz)   BMI 23.93 kg/m²     Objective    Physical Exam  Constitutional:       General: He is not in acute distress.     Appearance: Normal appearance. He is well-developed.   HENT:      Head: Normocephalic and atraumatic.   Neck:      Thyroid: No thyromegaly.   Cardiovascular:      Rate and Rhythm: Normal rate.   Pulmonary:      Effort: Pulmonary effort is normal.   Abdominal:      General: Bowel sounds are normal. There is no distension.      Palpations: Abdomen is soft. Abdomen is not rigid.      Tenderness: There is no abdominal tenderness. There is no guarding.      Hernia: No hernia is present.   Musculoskeletal:      Cervical back: Normal range of motion and neck supple.   Skin:     General: Skin is warm and dry.      Coloration: Skin is not pale.      Findings: No rash.   Neurological:      Mental Status: He is alert and oriented to person, place, and time.   Psychiatric:         Speech: Speech normal.         Behavior: Behavior is cooperative.       Lab on 05/25/2021   Component Date Value Ref Range Status   • Glucose 05/25/2021 95  65 - 99 mg/dL Final   • BUN 05/25/2021 " 23* 6 - 20 mg/dL Final   • Creatinine 05/25/2021 0.99  0.76 - 1.27 mg/dL Final   • Sodium 05/25/2021 142  136 - 145 mmol/L Final   • Potassium 05/25/2021 4.4  3.5 - 5.2 mmol/L Final    Slight hemolysis detected by analyzer. Results may be affected.   • Chloride 05/25/2021 107  98 - 107 mmol/L Final   • CO2 05/25/2021 25.6  22.0 - 29.0 mmol/L Final   • Calcium 05/25/2021 9.8  8.6 - 10.5 mg/dL Final   • Total Protein 05/25/2021 7.3  6.0 - 8.5 g/dL Final   • Albumin 05/25/2021 4.50  3.50 - 5.20 g/dL Final   • ALT (SGPT) 05/25/2021 21  1 - 41 U/L Final   • AST (SGOT) 05/25/2021 18  1 - 40 U/L Final   • Alkaline Phosphatase 05/25/2021 63  39 - 117 U/L Final   • Total Bilirubin 05/25/2021 0.2  0.0 - 1.2 mg/dL Final   • eGFR Non  Amer 05/25/2021 80  >60 mL/min/1.73 Final   • Globulin 05/25/2021 2.8  gm/dL Final   • A/G Ratio 05/25/2021 1.6  g/dL Final   • BUN/Creatinine Ratio 05/25/2021 23.2  7.0 - 25.0 Final   • Anion Gap 05/25/2021 9.4  5.0 - 15.0 mmol/L Final   • TSH 05/25/2021 2.760  0.270 - 4.200 uIU/mL Final   • 25 Hydroxy, Vitamin D 05/25/2021 18.8* 30.0 - 100.0 ng/ml Final   • Vitamin B-12 05/25/2021 315  211 - 946 pg/mL Final   • Total Cholesterol 05/25/2021 274* 0 - 200 mg/dL Final   • Triglycerides 05/25/2021 175* 0 - 150 mg/dL Final   • HDL Cholesterol 05/25/2021 56  40 - 60 mg/dL Final   • LDL Cholesterol  05/25/2021 186* 0 - 100 mg/dL Final   • VLDL Cholesterol 05/25/2021 32  5 - 40 mg/dL Final   • LDL/HDL Ratio 05/25/2021 3.27   Final   • Hepatitis C Ab 05/25/2021 Non-Reactive  Non-Reactive Final   • PSA 05/25/2021 1.190  0.000 - 4.000 ng/mL Final     Assessment/Plan      1. Encounter for screening for malignant neoplasm of colon    .       Orders placed during this encounter include:  Orders Placed This Encounter   Procedures   • Follow Anesthesia Guidelines / Standing Orders     Standing Status:   Future   • Obtain Informed Consent     Standing Status:   Future     Order Specific Question:    Informed Consent Given For     Answer:   colonoscopy       COLONOSCOPY (N/A)    Review and/or summary of lab tests, radiology, procedures, medications. Review and summary of old records and obtaining of history. The risks and benefits of my recommendations, as well as other treatment options were discussed with the patient today. Questions were answered.    New Medications Ordered This Visit   Medications   • PEG-KCl-NaCl-NaSulf-Na Asc-C (MoviPrep) 100 g reconstituted solution powder     Sig: Take 1,000 mL by mouth Every 12 (Twelve) Hours.     Dispense:  1000 mL     Refill:  0       Follow-up: Return if symptoms worsen or fail to improve.          This document has been electronically signed by TRI Mobley on June 21, 2021 14:43 CDT           I spent 10minutes caring for Ehsan on this date of service. This time includes time spent by me in the following activities:preparing for the visit, reviewing tests, obtaining and/or reviewing a separately obtained history, performing a medically appropriate examination and/or evaluation , counseling and educating the patient/family/caregiver, ordering medications, tests, or procedures, referring and communicating with other health care professionals , documenting information in the medical record and care coordination    Results for orders placed or performed in visit on 05/25/21   PSA Screen    Specimen: Blood   Result Value Ref Range    PSA 1.190 0.000 - 4.000 ng/mL   Hepatitis C antibody    Specimen: Blood   Result Value Ref Range    Hepatitis C Ab Non-Reactive Non-Reactive   Vitamin D 25 Hydroxy    Specimen: Blood   Result Value Ref Range    25 Hydroxy, Vitamin D 18.8 (L) 30.0 - 100.0 ng/ml   TSH    Specimen: Blood   Result Value Ref Range    TSH 2.760 0.270 - 4.200 uIU/mL   Vitamin B12    Specimen: Blood   Result Value Ref Range    Vitamin B-12 315 211 - 946 pg/mL   Lipid Panel    Specimen: Blood   Result Value Ref Range    Total Cholesterol 274 (H) 0 - 200 mg/dL     Triglycerides 175 (H) 0 - 150 mg/dL    HDL Cholesterol 56 40 - 60 mg/dL    LDL Cholesterol  186 (H) 0 - 100 mg/dL    VLDL Cholesterol 32 5 - 40 mg/dL    LDL/HDL Ratio 3.27    Comprehensive Metabolic Panel    Specimen: Blood   Result Value Ref Range    Glucose 95 65 - 99 mg/dL    BUN 23 (H) 6 - 20 mg/dL    Creatinine 0.99 0.76 - 1.27 mg/dL    Sodium 142 136 - 145 mmol/L    Potassium 4.4 3.5 - 5.2 mmol/L    Chloride 107 98 - 107 mmol/L    CO2 25.6 22.0 - 29.0 mmol/L    Calcium 9.8 8.6 - 10.5 mg/dL    Total Protein 7.3 6.0 - 8.5 g/dL    Albumin 4.50 3.50 - 5.20 g/dL    ALT (SGPT) 21 1 - 41 U/L    AST (SGOT) 18 1 - 40 U/L    Alkaline Phosphatase 63 39 - 117 U/L    Total Bilirubin 0.2 0.0 - 1.2 mg/dL    eGFR Non African Amer 80 >60 mL/min/1.73    Globulin 2.8 gm/dL    A/G Ratio 1.6 g/dL    BUN/Creatinine Ratio 23.2 7.0 - 25.0    Anion Gap 9.4 5.0 - 15.0 mmol/L   Results for orders placed or performed in visit on 03/13/19   CBC Auto Differential    Specimen: Blood   Result Value Ref Range    WBC 5.92 3.40 - 10.80 10*3/mm3    RBC 4.48 4.14 - 5.80 10*6/mm3    Hemoglobin 13.1 13.0 - 17.7 g/dL    Hematocrit 39.2 37.5 - 51.0 %    MCV 87.5 79.0 - 97.0 fL    MCH 29.2 26.6 - 33.0 pg    MCHC 33.4 31.5 - 35.7 g/dL    RDW 13.2 12.3 - 15.4 %    RDW-SD 41.6 37.0 - 54.0 fl    MPV 9.7 6.0 - 12.0 fL    Platelets 313 140 - 450 10*3/mm3    Neutrophil % 49.8 42.7 - 76.0 %    Lymphocyte % 36.8 19.6 - 45.3 %    Monocyte % 9.0 5.0 - 12.0 %    Eosinophil % 3.4 0.3 - 6.2 %    Basophil % 0.8 0.0 - 1.5 %    Immature Grans % 0.2 0.0 - 0.5 %    Neutrophils, Absolute 2.95 1.40 - 7.00 10*3/mm3    Lymphocytes, Absolute 2.18 0.70 - 3.10 10*3/mm3    Monocytes, Absolute 0.53 0.10 - 0.90 10*3/mm3    Eosinophils, Absolute 0.20 0.00 - 0.40 10*3/mm3    Basophils, Absolute 0.05 0.00 - 0.20 10*3/mm3    Immature Grans, Absolute 0.01 0.00 - 0.05 10*3/mm3    nRBC 0.0 0.0 - 0.0 /100 WBC   TSH    Specimen: Blood   Result Value Ref Range    TSH 1.580 0.460 - 4.680  mIU/mL   Lipid panel    Specimen: Blood   Result Value Ref Range    Total Cholesterol 302 (H) 0 - 199 mg/dL    Triglycerides 123 20 - 199 mg/dL    HDL Cholesterol 53 (L) 60 - 200 mg/dL    LDL Cholesterol  197 (H) 1 - 129 mg/dL    LDL/HDL Ratio 4.23 (H) 0.00 - 3.55   Comprehensive Metabolic Panel    Specimen: Blood   Result Value Ref Range    Glucose 87 60 - 100 mg/dL    BUN 18 7 - 21 mg/dL    Creatinine 1.20 0.70 - 1.30 mg/dL    Sodium 138 137 - 145 mmol/L    Potassium 4.5 3.5 - 5.1 mmol/L    Chloride 102 95 - 110 mmol/L    CO2 29.0 22.0 - 31.0 mmol/L    Calcium 9.7 8.4 - 10.2 mg/dL    Total Protein 7.7 6.3 - 8.6 g/dL    Albumin 4.60 3.40 - 4.80 g/dL    ALT (SGPT) 22 21 - 72 U/L    AST (SGOT) 51 17 - 59 U/L    Alkaline Phosphatase 50 38 - 126 U/L    Total Bilirubin 0.7 0.2 - 1.3 mg/dL    eGFR Non  Amer 64 63 - 147 mL/min/1.73    Globulin 3.1 2.3 - 3.5 gm/dL    A/G Ratio 1.5 1.1 - 1.8 g/dL    BUN/Creatinine Ratio 15.0 7.0 - 25.0    Anion Gap 7.0 5.0 - 15.0 mmol/L   Results for orders placed or performed in visit on 11/13/18   CBC Auto Differential    Specimen: Blood   Result Value Ref Range    WBC 7.67 3.20 - 9.80 10*3/mm3    RBC 4.74 4.37 - 5.74 10*6/mm3    Hemoglobin 14.0 13.7 - 17.3 g/dL    Hematocrit 41.2 39.0 - 49.0 %    MCV 86.9 80.0 - 98.0 fL    MCH 29.5 26.5 - 34.0 pg    MCHC 34.0 31.5 - 36.3 g/dL    RDW 13.3 11.5 - 14.5 %    RDW-SD 42.5 35.1 - 43.9 fl    MPV 10.0 8.0 - 12.0 fL    Platelets 260 150 - 450 10*3/mm3    Neutrophil % 68.2 37.0 - 80.0 %    Lymphocyte % 24.6 10.0 - 50.0 %    Monocyte % 6.1 0.0 - 12.0 %    Eosinophil % 0.9 0.0 - 7.0 %    Basophil % 0.1 0.0 - 2.0 %    Immature Grans % 0.1 0.0 - 0.5 %    Neutrophils, Absolute 5.22 2.00 - 8.60 10*3/mm3    Lymphocytes, Absolute 1.89 0.60 - 4.20 10*3/mm3    Monocytes, Absolute 0.47 0.00 - 0.90 10*3/mm3    Eosinophils, Absolute 0.07 0.00 - 0.70 10*3/mm3    Basophils, Absolute 0.01 0.00 - 0.20 10*3/mm3    Immature Grans, Absolute 0.01 0.00 - 0.02  10*3/mm3     *Note: Due to a large number of results and/or encounters for the requested time period, some results have not been displayed. A complete set of results can be found in Results Review.

## 2021-07-01 DIAGNOSIS — G25.81 RESTLESS LEG: ICD-10-CM

## 2021-07-01 RX ORDER — ROPINIROLE 2 MG/1
2 TABLET, FILM COATED ORAL NIGHTLY
Qty: 90 TABLET | Refills: 1 | Status: SHIPPED | OUTPATIENT
Start: 2021-07-01 | End: 2022-05-26

## 2021-07-13 ENCOUNTER — ANESTHESIA EVENT (OUTPATIENT)
Dept: GASTROENTEROLOGY | Facility: HOSPITAL | Age: 51
End: 2021-07-13

## 2021-07-13 ENCOUNTER — HOSPITAL ENCOUNTER (OUTPATIENT)
Facility: HOSPITAL | Age: 51
Setting detail: HOSPITAL OUTPATIENT SURGERY
Discharge: HOME | End: 2021-07-13
Attending: INTERNAL MEDICINE | Admitting: NURSE PRACTITIONER

## 2021-07-13 ENCOUNTER — ANESTHESIA (OUTPATIENT)
Dept: GASTROENTEROLOGY | Facility: HOSPITAL | Age: 51
End: 2021-07-13

## 2021-07-13 VITALS
OXYGEN SATURATION: 95 % | WEIGHT: 151.8 LBS | SYSTOLIC BLOOD PRESSURE: 108 MMHG | DIASTOLIC BLOOD PRESSURE: 66 MMHG | HEART RATE: 70 BPM | HEIGHT: 67 IN | RESPIRATION RATE: 18 BRPM | TEMPERATURE: 97.3 F | BODY MASS INDEX: 23.83 KG/M2

## 2021-07-13 DIAGNOSIS — Z12.11 ENCOUNTER FOR SCREENING FOR MALIGNANT NEOPLASM OF COLON: ICD-10-CM

## 2021-07-13 PROCEDURE — 45380 COLONOSCOPY AND BIOPSY: CPT | Performed by: INTERNAL MEDICINE

## 2021-07-13 PROCEDURE — 25010000002 PROPOFOL 10 MG/ML EMULSION: Performed by: NURSE ANESTHETIST, CERTIFIED REGISTERED

## 2021-07-13 RX ORDER — PROMETHAZINE HYDROCHLORIDE 25 MG/1
25 TABLET ORAL ONCE AS NEEDED
Status: DISCONTINUED | OUTPATIENT
Start: 2021-07-13 | End: 2021-07-13 | Stop reason: HOSPADM

## 2021-07-13 RX ORDER — ONDANSETRON 2 MG/ML
4 INJECTION INTRAMUSCULAR; INTRAVENOUS ONCE AS NEEDED
Status: DISCONTINUED | OUTPATIENT
Start: 2021-07-13 | End: 2021-07-13 | Stop reason: HOSPADM

## 2021-07-13 RX ORDER — PROMETHAZINE HYDROCHLORIDE 25 MG/1
25 SUPPOSITORY RECTAL ONCE AS NEEDED
Status: DISCONTINUED | OUTPATIENT
Start: 2021-07-13 | End: 2021-07-13 | Stop reason: HOSPADM

## 2021-07-13 RX ORDER — DEXTROSE AND SODIUM CHLORIDE 5; .45 G/100ML; G/100ML
30 INJECTION, SOLUTION INTRAVENOUS CONTINUOUS PRN
Status: DISCONTINUED | OUTPATIENT
Start: 2021-07-13 | End: 2021-07-13 | Stop reason: HOSPADM

## 2021-07-13 RX ORDER — MEPERIDINE HYDROCHLORIDE 25 MG/ML
12.5 INJECTION INTRAMUSCULAR; INTRAVENOUS; SUBCUTANEOUS
Status: DISCONTINUED | OUTPATIENT
Start: 2021-07-13 | End: 2021-07-13 | Stop reason: HOSPADM

## 2021-07-13 RX ORDER — LIDOCAINE HYDROCHLORIDE 20 MG/ML
INJECTION, SOLUTION INTRAVENOUS AS NEEDED
Status: DISCONTINUED | OUTPATIENT
Start: 2021-07-13 | End: 2021-07-13 | Stop reason: SURG

## 2021-07-13 RX ORDER — PROPOFOL 10 MG/ML
VIAL (ML) INTRAVENOUS AS NEEDED
Status: DISCONTINUED | OUTPATIENT
Start: 2021-07-13 | End: 2021-07-13 | Stop reason: SURG

## 2021-07-13 RX ADMIN — PROPOFOL 100 MG: 10 INJECTION, EMULSION INTRAVENOUS at 13:13

## 2021-07-13 RX ADMIN — PROPOFOL 20 MG: 10 INJECTION, EMULSION INTRAVENOUS at 13:19

## 2021-07-13 RX ADMIN — PROPOFOL 20 MG: 10 INJECTION, EMULSION INTRAVENOUS at 13:21

## 2021-07-13 RX ADMIN — PROPOFOL 20 MG: 10 INJECTION, EMULSION INTRAVENOUS at 13:16

## 2021-07-13 RX ADMIN — LIDOCAINE HYDROCHLORIDE 100 MG: 20 INJECTION, SOLUTION INTRAVENOUS at 13:13

## 2021-07-13 RX ADMIN — DEXTROSE AND SODIUM CHLORIDE 30 ML/HR: 5; 450 INJECTION, SOLUTION INTRAVENOUS at 12:39

## 2021-07-13 RX ADMIN — PROPOFOL 20 MG: 10 INJECTION, EMULSION INTRAVENOUS at 13:15

## 2021-07-13 RX ADMIN — PROPOFOL 40 MG: 10 INJECTION, EMULSION INTRAVENOUS at 13:22

## 2021-07-13 NOTE — ANESTHESIA PREPROCEDURE EVALUATION
Anesthesia Evaluation     NPO Solid Status: > 8 hours  NPO Liquid Status: > 8 hours           Airway   Mallampati: II  Neck ROM: full  Dental    (+) poor dentition    Pulmonary - normal exam   (+) shortness of breath, sleep apnea,   Cardiovascular - normal exam    (+) hyperlipidemia,       Neuro/Psych  (+) psychiatric history,     GI/Hepatic/Renal/Endo      Musculoskeletal     Abdominal    Substance History      OB/GYN          Other                        Anesthesia Plan    ASA 3     MAC     intravenous induction     Anesthetic plan, all risks, benefits, and alternatives have been provided, discussed and informed consent has been obtained with: patient.

## 2021-07-13 NOTE — ANESTHESIA POSTPROCEDURE EVALUATION
Patient: Ehsan Pena    Procedure Summary     Date: 07/13/21 Room / Location: [facility] ENDOSCOPY 2 / [facility] ENDOSCOPY    Anesthesia Start: 1309 Anesthesia Stop: 1330    Procedure: COLONOSCOPY (N/A ) Diagnosis:       Encounter for screening for malignant neoplasm of colon      (Encounter for screening for malignant neoplasm of colon [Z12.11])    Surgeons: Laura Wu MD Provider: Tonny Rosado CRNA    Anesthesia Type: MAC ASA Status: 3          Anesthesia Type: MAC    Vitals  No vitals data found for the desired time range.          Post Anesthesia Care and Evaluation    Patient location during evaluation: bedside  Patient participation: complete - patient cannot participate  Level of consciousness: awake  Pain score: 0  Pain management: adequate  Airway patency: patent  Anesthetic complications: No anesthetic complications  PONV Status: none  Cardiovascular status: acceptable  Respiratory status: acceptable  Hydration status: acceptable

## 2021-07-14 LAB
LAB AP CASE REPORT: NORMAL
PATH REPORT.FINAL DX SPEC: NORMAL

## 2021-07-20 ENCOUNTER — OFFICE VISIT (OUTPATIENT)
Dept: GASTROENTEROLOGY | Facility: CLINIC | Age: 51
End: 2021-07-20

## 2021-07-20 VITALS
HEART RATE: 81 BPM | HEIGHT: 67 IN | DIASTOLIC BLOOD PRESSURE: 80 MMHG | SYSTOLIC BLOOD PRESSURE: 166 MMHG | BODY MASS INDEX: 23.92 KG/M2 | WEIGHT: 152.4 LBS

## 2021-07-20 DIAGNOSIS — K63.5 HYPERPLASTIC POLYP OF SIGMOID COLON: ICD-10-CM

## 2021-07-20 DIAGNOSIS — K57.90 DIVERTICULOSIS: Primary | ICD-10-CM

## 2021-07-20 PROCEDURE — 99212 OFFICE O/P EST SF 10 MIN: CPT | Performed by: NURSE PRACTITIONER

## 2021-07-20 NOTE — PROGRESS NOTES
Chief Complaint   Patient presents with   • Colon Polyps       Subjective    Ehsan Pena is a 50 y.o. male. he is here today for follow-up.    History of Present Illness  50-year-old male presents to discuss screening colonoscopy results.  Denies any abdominal pain nausea vomiting or changes in his bowel habits.  Denies any melena or hematochezia.  Reports he occasionally has issues with diarrhea but that has not been a problem recently.  Colonoscopy was completed 7/13/2021 noted 5 mm polyp in sigmoid colon which was removed resection retrieval was complete diverticulosis was seen throughout the entire colon.  Sigmoid colon polyp found to be hyperplastic repeat recommended in 5 years       The following portions of the patient's history were reviewed and updated as appropriate:   Past Medical History:   Diagnosis Date   • Dyspnea on exertion    • Fatigue    • Impulse control disorder    • Insomnia    • Major depressive disorder, recurrent (CMS/HCC)    • Restless legs    • Sleep apnea    • Spina bifida (CMS/HCC)      Past Surgical History:   Procedure Laterality Date   • COLONOSCOPY N/A 7/13/2021    Procedure: COLONOSCOPY;  Surgeon: Laura Wu MD;  Location: [facility] ENDOSCOPY;  Service: Gastroenterology;  Laterality: N/A;   • CSF SHUNT     • INJECTION OF MEDICATION  10/12/2015    KENALOG(1)     Family History   Problem Relation Age of Onset   • Stroke Mother    • Heart disease Father    • Diabetes Maternal Grandmother        Prior to Admission medications    Medication Sig Start Date End Date Taking? Authorizing Provider   QUEtiapine (SEROquel) 300 MG tablet Take 1 tablet by mouth Every Night. 4/6/21  Yes Wilma Guerrero APRN   rOPINIRole (REQUIP) 2 MG tablet TAKE 1 TABLET BY MOUTH EVERY NIGHT 7/1/21  Yes Wilma Guerrero APRN   vitamin D (ERGOCALCIFEROL) 1.25 MG (34444 UT) capsule capsule Take 1 capsule by mouth Every 7 (Seven) Days. 5/28/21  Yes Wilma Guerrero APRN     Allergies   Allergen Reactions   •  "Simvastatin Swelling   • Fenofibrate Swelling     Facial swelling   • Penicillins Rash     Social History     Socioeconomic History   • Marital status: Single     Spouse name: Not on file   • Number of children: Not on file   • Years of education: Not on file   • Highest education level: Not on file   Tobacco Use   • Smoking status: Never Smoker   • Smokeless tobacco: Never Used   Vaping Use   • Vaping Use: Never used   Substance and Sexual Activity   • Alcohol use: No   • Drug use: No       Review of Systems  Review of Systems   Constitutional: Negative for appetite change, chills, diaphoresis, fatigue, fever and unexpected weight change.   Respiratory: Negative for cough and shortness of breath.    Gastrointestinal: Negative for abdominal distention, abdominal pain, anal bleeding, blood in stool, constipation, diarrhea, nausea and vomiting.        /80 (BP Location: Left arm)   Pulse 81   Ht 170.2 cm (67\")   Wt 69.1 kg (152 lb 6.4 oz)   BMI 23.87 kg/m²     Objective    Physical Exam  Constitutional:       General: He is not in acute distress.     Appearance: Normal appearance. He is well-developed.   HENT:      Head: Normocephalic and atraumatic.   Neck:      Thyroid: No thyromegaly.   Pulmonary:      Effort: Pulmonary effort is normal.   Abdominal:      General: Bowel sounds are normal. There is no distension.      Palpations: Abdomen is soft. Abdomen is not rigid.      Tenderness: There is no abdominal tenderness. There is no guarding.      Hernia: No hernia is present.   Musculoskeletal:      Cervical back: Normal range of motion and neck supple.   Skin:     General: Skin is warm and dry.      Coloration: Skin is not pale.      Findings: No rash.   Neurological:      Mental Status: He is alert and oriented to person, place, and time.   Psychiatric:         Speech: Speech normal.         Behavior: Behavior is cooperative.       Admission on 07/13/2021, Discharged on 07/13/2021   Component Date Value Ref " Range Status   • Case Report 07/13/2021    Final                    Value:Surgical Pathology Report                         Case: [id number]                                  Authorizing Provider:  Laura Wu MD      Collected:           07/13/2021 01:25 PM          Ordering Location:     [facility]             Received:            07/13/2021 01:46 PM                                 [facility]                                                     Pathologist:           Naman Delgadillo MD                                                          Specimen:    Large Intestine, Sigmoid Colon                                                            • Final Diagnosis 07/13/2021    Final                    Value:This result contains rich text formatting which cannot be displayed here.     Assessment/Plan      1. Diverticulosis    2. Hyperplastic polyp of sigmoid colon    .   Discussed importance of high-fiber diet and recommend over-the-counter fiber supplement daily.  Repeat colonoscopy in 5 years for surveillance return to GI office sooner if needed    Orders placed during this encounter include:  No orders of the defined types were placed in this encounter.      * Surgery not found *    Review and/or summary of lab tests, radiology, procedures, medications. Review and summary of old records and obtaining of history. The risks and benefits of my recommendations, as well as other treatment options were discussed with the patient today. Questions were answered.    No orders of the defined types were placed in this encounter.      Follow-up: Return in about 5 years (around 7/20/2026) for Recheck.          This document has been electronically signed by TRI Mobley on July 20, 2021 13:31 CDT           I spent 10 minutes caring for Ehsan on this date of service. This time includes time spent by me in the following activities:preparing for the visit, reviewing tests, obtaining and/or reviewing  a separately obtained history, performing a medically appropriate examination and/or evaluation , counseling and educating the patient/family/caregiver, ordering medications, tests, or procedures, referring and communicating with other health care professionals , documenting information in the medical record and care coordination    Results for orders placed or performed during the hospital encounter of 07/13/21   Tissue Pathology Exam    Specimen: Large Intestine, Sigmoid Colon; Tissue   Result Value Ref Range    Case Report       Surgical Pathology Report                         Case: [id number]                                  Authorizing Provider:  Laura Wu MD      Collected:           07/13/2021 01:25 PM          [facility]             Received:            07/13/2021 01:46 PM                                 [facility]                                                     Pathologist:           Naman Delgadillo MD                                                          Specimen:    Large Intestine, Sigmoid Colon                                                             Final Diagnosis       SEE SCANNED REPORT       Results for orders placed or performed in visit on 05/25/21   PSA Screen    Specimen: Blood   Result Value Ref Range    PSA 1.190 0.000 - 4.000 ng/mL   Hepatitis C antibody    Specimen: Blood   Result Value Ref Range    Hepatitis C Ab Non-Reactive Non-Reactive   Vitamin D 25 Hydroxy    Specimen: Blood   Result Value Ref Range    25 Hydroxy, Vitamin D 18.8 (L) 30.0 - 100.0 ng/ml   TSH    Specimen: Blood   Result Value Ref Range    TSH 2.760 0.270 - 4.200 uIU/mL   Vitamin B12    Specimen: Blood   Result Value Ref Range    Vitamin B-12 315 211 - 946 pg/mL   Lipid Panel    Specimen: Blood   Result Value Ref Range    Total Cholesterol 274 (H) 0 - 200 mg/dL    Triglycerides 175 (H) 0 - 150 mg/dL    HDL Cholesterol 56 40 - 60 mg/dL    LDL Cholesterol  186  (H) 0 - 100 mg/dL    VLDL Cholesterol 32 5 - 40 mg/dL    LDL/HDL Ratio 3.27    Comprehensive Metabolic Panel    Specimen: Blood   Result Value Ref Range    Glucose 95 65 - 99 mg/dL    BUN 23 (H) 6 - 20 mg/dL    Creatinine 0.99 0.76 - 1.27 mg/dL    Sodium 142 136 - 145 mmol/L    Potassium 4.4 3.5 - 5.2 mmol/L    Chloride 107 98 - 107 mmol/L    CO2 25.6 22.0 - 29.0 mmol/L    Calcium 9.8 8.6 - 10.5 mg/dL    Total Protein 7.3 6.0 - 8.5 g/dL    Albumin 4.50 3.50 - 5.20 g/dL    ALT (SGPT) 21 1 - 41 U/L    AST (SGOT) 18 1 - 40 U/L    Alkaline Phosphatase 63 39 - 117 U/L    Total Bilirubin 0.2 0.0 - 1.2 mg/dL    eGFR Non African Amer 80 >60 mL/min/1.73    Globulin 2.8 gm/dL    A/G Ratio 1.6 g/dL    BUN/Creatinine Ratio 23.2 7.0 - 25.0    Anion Gap 9.4 5.0 - 15.0 mmol/L   Results for orders placed or performed in visit on 03/13/19   CBC Auto Differential    Specimen: Blood   Result Value Ref Range    WBC 5.92 3.40 - 10.80 10*3/mm3    RBC 4.48 4.14 - 5.80 10*6/mm3    Hemoglobin 13.1 13.0 - 17.7 g/dL    Hematocrit 39.2 37.5 - 51.0 %    MCV 87.5 79.0 - 97.0 fL    MCH 29.2 26.6 - 33.0 pg    MCHC 33.4 31.5 - 35.7 g/dL    RDW 13.2 12.3 - 15.4 %    RDW-SD 41.6 37.0 - 54.0 fl    MPV 9.7 6.0 - 12.0 fL    Platelets 313 140 - 450 10*3/mm3    Neutrophil % 49.8 42.7 - 76.0 %    Lymphocyte % 36.8 19.6 - 45.3 %    Monocyte % 9.0 5.0 - 12.0 %    Eosinophil % 3.4 0.3 - 6.2 %    Basophil % 0.8 0.0 - 1.5 %    Immature Grans % 0.2 0.0 - 0.5 %    Neutrophils, Absolute 2.95 1.40 - 7.00 10*3/mm3    Lymphocytes, Absolute 2.18 0.70 - 3.10 10*3/mm3    Monocytes, Absolute 0.53 0.10 - 0.90 10*3/mm3    Eosinophils, Absolute 0.20 0.00 - 0.40 10*3/mm3    Basophils, Absolute 0.05 0.00 - 0.20 10*3/mm3    Immature Grans, Absolute 0.01 0.00 - 0.05 10*3/mm3    nRBC 0.0 0.0 - 0.0 /100 WBC   TSH    Specimen: Blood   Result Value Ref Range    TSH 1.580 0.460 - 4.680 mIU/mL   Lipid panel    Specimen: Blood   Result Value Ref Range    Total Cholesterol 302 (H) 0 -  199 mg/dL    Triglycerides 123 20 - 199 mg/dL    HDL Cholesterol 53 (L) 60 - 200 mg/dL    LDL Cholesterol  197 (H) 1 - 129 mg/dL    LDL/HDL Ratio 4.23 (H) 0.00 - 3.55   Comprehensive Metabolic Panel    Specimen: Blood   Result Value Ref Range    Glucose 87 60 - 100 mg/dL    BUN 18 7 - 21 mg/dL    Creatinine 1.20 0.70 - 1.30 mg/dL    Sodium 138 137 - 145 mmol/L    Potassium 4.5 3.5 - 5.1 mmol/L    Chloride 102 95 - 110 mmol/L    CO2 29.0 22.0 - 31.0 mmol/L    Calcium 9.7 8.4 - 10.2 mg/dL    Total Protein 7.7 6.3 - 8.6 g/dL    Albumin 4.60 3.40 - 4.80 g/dL    ALT (SGPT) 22 21 - 72 U/L    AST (SGOT) 51 17 - 59 U/L    Alkaline Phosphatase 50 38 - 126 U/L    Total Bilirubin 0.7 0.2 - 1.3 mg/dL    eGFR Non  Amer 64 63 - 147 mL/min/1.73    Globulin 3.1 2.3 - 3.5 gm/dL    A/G Ratio 1.5 1.1 - 1.8 g/dL    BUN/Creatinine Ratio 15.0 7.0 - 25.0    Anion Gap 7.0 5.0 - 15.0 mmol/L   Results for orders placed or performed in visit on 11/13/18   CBC Auto Differential    Specimen: Blood   Result Value Ref Range    WBC 7.67 3.20 - 9.80 10*3/mm3    RBC 4.74 4.37 - 5.74 10*6/mm3    Hemoglobin 14.0 13.7 - 17.3 g/dL    Hematocrit 41.2 39.0 - 49.0 %    MCV 86.9 80.0 - 98.0 fL    MCH 29.5 26.5 - 34.0 pg    MCHC 34.0 31.5 - 36.3 g/dL    RDW 13.3 11.5 - 14.5 %    RDW-SD 42.5 35.1 - 43.9 fl    MPV 10.0 8.0 - 12.0 fL    Platelets 260 150 - 450 10*3/mm3    Neutrophil % 68.2 37.0 - 80.0 %    Lymphocyte % 24.6 10.0 - 50.0 %    Monocyte % 6.1 0.0 - 12.0 %    Eosinophil % 0.9 0.0 - 7.0 %    Basophil % 0.1 0.0 - 2.0 %    Immature Grans % 0.1 0.0 - 0.5 %    Neutrophils, Absolute 5.22 2.00 - 8.60 10*3/mm3    Lymphocytes, Absolute 1.89 0.60 - 4.20 10*3/mm3    Monocytes, Absolute 0.47 0.00 - 0.90 10*3/mm3    Eosinophils, Absolute 0.07 0.00 - 0.70 10*3/mm3    Basophils, Absolute 0.01 0.00 - 0.20 10*3/mm3    Immature Grans, Absolute 0.01 0.00 - 0.02 10*3/mm3     *Note: Due to a large number of results and/or encounters for the requested time period,  some results have not been displayed. A complete set of results can be found in Results Review.

## 2021-07-20 NOTE — PATIENT INSTRUCTIONS
Colon Polyps    Polyps are tissue growths inside the body. Polyps can grow in many places, including the large intestine (colon). A polyp may be a round bump or a mushroom-shaped growth. You could have one polyp or several.  Most colon polyps are noncancerous (benign). However, some colon polyps can become cancerous over time. Finding and removing the polyps early can help prevent this.  What are the causes?  The exact cause of colon polyps is not known.  What increases the risk?  You are more likely to develop this condition if you:  · Have a family history of colon cancer or colon polyps.  · Are older than 50 or older than 45 if you are .  · Have inflammatory bowel disease, such as ulcerative colitis or Crohn's disease.  · Have certain hereditary conditions, such as:  ? Familial adenomatous polyposis.  ? Girard syndrome.  ? Turcot syndrome.  ? Peutz-Jeghers syndrome.  · Are overweight.  · Smoke cigarettes.  · Do not get enough exercise.  · Drink too much alcohol.  · Eat a diet that is high in fat and red meat and low in fiber.  · Had childhood cancer that was treated with abdominal radiation.  What are the signs or symptoms?  Most polyps do not cause symptoms.  If you have symptoms, they may include:  · Blood coming from your rectum when having a bowel movement.  · Blood in your stool. The stool may look dark red or black.  · Abdominal pain.  · A change in bowel habits, such as constipation or diarrhea.  How is this diagnosed?  This condition is diagnosed with a colonoscopy. This is a procedure in which a lighted, flexible scope is inserted into the anus and then passed into the colon to examine the area. Polyps are sometimes found when a colonoscopy is done as part of routine cancer screening tests.  How is this treated?  Treatment for this condition involves removing any polyps that are found. Most polyps can be removed during a colonoscopy. Those polyps will then be tested for cancer. Additional  treatment may be needed depending on the results of testing.  Follow these instructions at home:  Lifestyle  · Maintain a healthy weight, or lose weight if recommended by your health care provider.  · Exercise every day or as told by your health care provider.  · Do not use any products that contain nicotine or tobacco, such as cigarettes and e-cigarettes. If you need help quitting, ask your health care provider.  · If you drink alcohol, limit how much you have:  ? 0-1 drink a day for women.  ? 0-2 drinks a day for men.  · Be aware of how much alcohol is in your drink. In the [place] one drink equals one 12 oz bottle of beer (355 mL), one 5 oz glass of wine (148 mL), or one 1½ oz shot of hard liquor (44 mL).  Eating and drinking    · Eat foods that are high in fiber, such as fruits, vegetables, and whole grains.  · Eat foods that are high in calcium and vitamin D, such as milk, cheese, yogurt, eggs, liver, fish, and broccoli.  · Limit foods that are high in fat, such as fried foods and desserts.  · Limit the amount of red meat and processed meat you eat, such as hot dogs, sausage, yoder, and lunch meats.  General instructions  · Keep all follow-up visits as told by your health care provider. This is important.  ? This includes having regularly scheduled colonoscopies.  ? Talk to your health care provider about when you need a colonoscopy.  Contact a health care provider if:  · You have new or worsening bleeding during a bowel movement.  · You have new or increased blood in your stool.  · You have a change in bowel habits.  · You lose weight for no known reason.  Summary  · Polyps are tissue growths inside the body. Polyps can grow in many places, including the colon.  · Most colon polyps are noncancerous (benign), but some can become cancerous over time.  · This condition is diagnosed with a colonoscopy.  · Treatment for this condition involves removing any polyps that are found. Most polyps can be removed during a  colonoscopy.  This information is not intended to replace advice given to you by your health care provider. Make sure you discuss any questions you have with your health care provider.  Document Revised: 04/04/2019 Document Reviewed: 04/04/2019  Elsevier Patient Education © 2021 [facility]    Diverticulosis    Diverticulosis is a condition that develops when small pouches (diverticula) form in the wall of the large intestine (colon). The colon is where water is absorbed and stool (feces) is formed. The pouches form when the inside layer of the colon pushes through weak spots in the outer layers of the colon. You may have a few pouches or many of them.  The pouches usually do not cause problems unless they become inflamed or infected. When this happens, the condition is called diverticulitis.  What are the causes?  The cause of this condition is not known.  What increases the risk?  The following factors may make you more likely to develop this condition:  · Being older than age 60. Your risk for this condition increases with age. Diverticulosis is rare among people younger than age 30. By age 80, many people have it.  · Eating a low-fiber diet.  · Having frequent constipation.  · Being overweight.  · Not getting enough exercise.  · Smoking.  · Taking over-the-counter pain medicines, like aspirin and ibuprofen.  · Having a family history of diverticulosis.  What are the signs or symptoms?  In most people, there are no symptoms of this condition. If you do have symptoms, they may include:  · Bloating.  · Cramps in the abdomen.  · Constipation or diarrhea.  · Pain in the lower left side of the abdomen.  How is this diagnosed?  Because diverticulosis usually has no symptoms, it is most often diagnosed during an exam for other colon problems. The condition may be diagnosed by:  · Using a flexible scope to examine the colon (colonoscopy).  · Taking an X-ray of the colon after dye has been put into the colon (barium  enema).  · Having a CT scan.  How is this treated?  You may not need treatment for this condition. Your health care provider may recommend treatment to prevent problems. You may need treatment if you have symptoms or if you previously had diverticulitis. Treatment may include:  · Eating a high-fiber diet.  · Taking a fiber supplement.  · Taking a live bacteria supplement (probiotic).  · Taking medicine to relax your colon.  Follow these instructions at home:  Medicines  · Take over-the-counter and prescription medicines only as told by your health care provider.  · If told by your health care provider, take a fiber supplement or probiotic.  Constipation prevention  Your condition may cause constipation. To prevent or treat constipation, you may need to:  · Drink enough fluid to keep your urine pale yellow.  · Take over-the-counter or prescription medicines.  · Eat foods that are high in fiber, such as beans, whole grains, and fresh fruits and vegetables.  · Limit foods that are high in fat and processed sugars, such as fried or sweet foods.    General instructions  · Try not to strain when you have a bowel movement.  · Keep all follow-up visits as told by your health care provider. This is important.  Contact a health care provider if you:  · Have pain in your abdomen.  · Have bloating.  · Have cramps.  · Have not had a bowel movement in 3 days.  Get help right away if:  · Your pain gets worse.  · Your bloating becomes very bad.  · You have a fever or chills, and your symptoms suddenly get worse.  · You vomit.  · You have bowel movements that are bloody or black.  · You have bleeding from your rectum.  Summary  · Diverticulosis is a condition that develops when small pouches (diverticula) form in the wall of the large intestine (colon).  · You may have a few pouches or many of them.  · This condition is most often diagnosed during an exam for other colon problems.  · Treatment may include increasing the fiber in  your diet, taking supplements, or taking medicines.  This information is not intended to replace advice given to you by your health care provider. Make sure you discuss any questions you have with your health care provider.  Document Revised: 07/16/2020 Document Reviewed: 07/16/2020  [employer] Patient Education © 2021 [employer]    High-Fiber Diet  Fiber, also called dietary fiber, is a type of carbohydrate that is found in fruits, vegetables, whole grains, and beans. A high-fiber diet can have many health benefits. Your health care provider may recommend a high-fiber diet to help:  · Prevent constipation. Fiber can make your bowel movements more regular.  · Lower your cholesterol.  · Relieve the following conditions:  ? Swelling of veins in the anus (hemorrhoids).  ? Swelling and irritation (inflammation) of specific areas of the digestive tract (uncomplicated diverticulosis).  ? A problem of the large intestine (colon) that sometimes causes pain and diarrhea (irritable bowel syndrome, IBS).  · Prevent overeating as part of a weight-loss plan.  · Prevent heart disease, type 2 diabetes, and certain cancers.  What is my plan?  The recommended daily fiber intake in grams (g) includes:  · 38 g for men age 50 or younger.  · 30 g for men over age 50.  · 25 g for women age 50 or younger.  · 21 g for women over age 50.  You can get the recommended daily intake of dietary fiber by:  · Eating a variety of fruits, vegetables, grains, and beans.  · Taking a fiber supplement, if it is not possible to get enough fiber through your diet.  What do I need to know about a high-fiber diet?  · It is better to get fiber through food sources rather than from fiber supplements. There is not a lot of research about how effective supplements are.  · Always check the fiber content on the nutrition facts label of any prepackaged food. Look for foods that contain 5 g of fiber or more per serving.  · Talk with a diet and nutrition specialist  (dietitian) if you have questions about specific foods that are recommended or not recommended for your medical condition, especially if those foods are not listed below.  · Gradually increase how much fiber you consume. If you increase your intake of dietary fiber too quickly, you may have bloating, cramping, or gas.  · Drink plenty of water. Water helps you to digest fiber.  What are tips for following this plan?  · Eat a wide variety of high-fiber foods.  · Make sure that half of the grains that you eat each day are whole grains.  · Eat breads and cereals that are made with whole-grain flour instead of refined flour or white flour.  · Eat brown rice, bulgur wheat, or millet instead of white rice.  · Start the day with a breakfast that is high in fiber, such as a cereal that contains 5 g of fiber or more per serving.  · Use beans in place of meat in soups, salads, and pasta dishes.  · Eat high-fiber snacks, such as berries, raw vegetables, nuts, and popcorn.  · Choose whole fruits and vegetables instead of processed forms like juice or sauce.  What foods can I eat?    Fruits  Berries. Pears. Apples. Oranges. Avocado. Prunes and raisins. Dried figs.  Vegetables  Sweet potatoes. Spinach. Kale. Artichokes. Cabbage. Broccoli. Cauliflower. Green peas. Carrots. Squash.  Grains  Whole-grain breads. Multigrain cereal. Oats and oatmeal. Brown rice. Barley. Bulgur wheat. Millet. Quinoa. Bran muffins. Popcorn. Rye wafer crackers.  Meats and other proteins  [place]y, kidney, and obando beans. Soybeans. Split peas. Lentils. Nuts and seeds.  Dairy  Fiber-fortified yogurt.  Beverages  Fiber-fortified soy milk. Fiber-fortified orange juice.  Other foods  Fiber bars.  The items listed above may not be a complete list of recommended foods and beverages. Contact a dietitian for more options.  What foods are not recommended?  Fruits  Fruit juice. Cooked, strained fruit.  Vegetables  Fried potatoes. Canned vegetables. Well-cooked  vegetables.  Grains  White bread. Pasta made with refined flour. White rice.  Meats and other proteins  Fatty cuts of meat. Fried chicken or fried fish.  Dairy  Milk. Yogurt. Cream cheese. Sour cream.  Fats and oils  [place].  Beverages  Soft drinks.  Other foods  Cakes and pastries.  The items listed above may not be a complete list of foods and beverages to avoid. Contact a dietitian for more information.  Summary  · Fiber is a type of carbohydrate. It is found in fruits, vegetables, whole grains, and beans.  · There are many health benefits of eating a high-fiber diet, such as preventing constipation, lowering blood cholesterol, helping with weight loss, and reducing your risk of heart disease, diabetes, and certain cancers.  · Gradually increase your intake of fiber. Increasing too fast can result in cramping, bloating, and gas. Drink plenty of water while you increase your fiber.  · The best sources of fiber include whole fruits and vegetables, whole grains, nuts, seeds, and beans.  This information is not intended to replace advice given to you by your health care provider. Make sure you discuss any questions you have with your health care provider.  Document Revised: 10/22/2018 Document Reviewed: 10/22/2018  [employer] Patient Education © 2021 [employer] Inc.

## 2021-09-30 DIAGNOSIS — F33.1 MODERATE EPISODE OF RECURRENT MAJOR DEPRESSIVE DISORDER (HCC): ICD-10-CM

## 2021-09-30 RX ORDER — QUETIAPINE FUMARATE 300 MG/1
300 TABLET, FILM COATED ORAL NIGHTLY
Qty: 90 TABLET | Refills: 1 | Status: SHIPPED | OUTPATIENT
Start: 2021-09-30 | End: 2022-05-26

## 2022-05-26 DIAGNOSIS — F33.1 MODERATE EPISODE OF RECURRENT MAJOR DEPRESSIVE DISORDER: ICD-10-CM

## 2022-05-26 DIAGNOSIS — G25.81 RESTLESS LEG: ICD-10-CM

## 2022-05-26 RX ORDER — ROPINIROLE 2 MG/1
2 TABLET, FILM COATED ORAL NIGHTLY
Qty: 30 TABLET | Refills: 0 | Status: SHIPPED | OUTPATIENT
Start: 2022-05-26

## 2022-05-26 RX ORDER — QUETIAPINE FUMARATE 300 MG/1
300 TABLET, FILM COATED ORAL NIGHTLY
Qty: 30 TABLET | Refills: 0 | Status: SHIPPED | OUTPATIENT
Start: 2022-05-26

## 2022-05-26 NOTE — TELEPHONE ENCOUNTER
Last OV 05/21/2021     I talked with his brother which is his caregiver.  He just started a new job and can not bring him in at this time.   He does have an appointment to see TRI Ohara in [place] to assume his care.     I did tell him that if they need to get back in with Jose R Dillon the future just give the office a call.      30 day supply has been given on the requested medication

## 2022-05-26 NOTE — TELEPHONE ENCOUNTER
Incoming Refill Request      Medication requested (name and dose): QUEtiapine (SEROquel) 300 MG tablet  rOPINIRole (REQUIP) 2 MG tablet  Pharmacy where request should be sent: [facility]     Additional details provided by patient: not sure which one needs the one for his behavior not RLS     Best call back number: [phone]    Does the patient have less than a 3 day supply:  [x] Yes  [] No    Chey Feng Rep  05/26/22, 10:23 CDT

## 2023-06-08 ENCOUNTER — TRANSCRIBE ORDERS (OUTPATIENT)
Dept: PODIATRY | Facility: CLINIC | Age: 53
End: 2023-06-08
Payer: COMMERCIAL

## 2023-06-08 DIAGNOSIS — R26.9 ABNORMALITY OF GAIT: Primary | ICD-10-CM

## 2023-06-15 ENCOUNTER — OFFICE VISIT (OUTPATIENT)
Dept: PODIATRY | Facility: CLINIC | Age: 53
End: 2023-06-15
Payer: COMMERCIAL

## 2023-06-15 VITALS — WEIGHT: 152 LBS | HEIGHT: 67 IN | BODY MASS INDEX: 23.86 KG/M2

## 2023-06-15 DIAGNOSIS — Q66.71 CAVUS DEFORMITY OF RIGHT FOOT: Primary | ICD-10-CM

## 2023-06-15 PROCEDURE — 99213 OFFICE O/P EST LOW 20 MIN: CPT | Performed by: NURSE PRACTITIONER

## 2023-06-15 RX ORDER — ROSUVASTATIN CALCIUM 20 MG/1
20 TABLET, COATED ORAL DAILY
COMMUNITY
Start: 2023-02-22 | End: 2023-08-22

## 2023-06-15 NOTE — PROGRESS NOTES
Ehsan Pena  1970  52 y.o. male        06/15/2023    Chief Complaint   Patient presents with   • Right Foot - Gait Problem       History of Present Illness    Ehsan Pena is a 52 y.o.male who presents to clinic today for complaint of wearing out his right shoe on the outside. Patient states he thinks it is from the way he stands, and has something to do with his foot. He brought an xray disc today.      Past Medical History:   Diagnosis Date   • Dyspnea on exertion    • Fatigue    • Impulse control disorder    • Insomnia    • Major depressive disorder, recurrent    • Restless legs    • Sleep apnea    • Spina bifida          Past Surgical History:   Procedure Laterality Date   • COLONOSCOPY N/A 7/13/2021    Procedure: COLONOSCOPY;  Surgeon: Laura Wu MD;  Location: [facility] ENDOSCOPY;  Service: Gastroenterology;  Laterality: N/A;   • CSF SHUNT     • INJECTION OF MEDICATION  10/12/2015    KENALOG(1)         Family History   Problem Relation Age of Onset   • Stroke Mother    • Heart disease Father    • Diabetes Maternal Grandmother        Allergies   Allergen Reactions   • Simvastatin Swelling   • Fenofibrate Swelling     Facial swelling   • Penicillins Rash       Social History     Socioeconomic History   • Marital status: Single   Tobacco Use   • Smoking status: Never   • Smokeless tobacco: Never   Vaping Use   • Vaping Use: Never used   Substance and Sexual Activity   • Alcohol use: No   • Drug use: No         Current Outpatient Medications   Medication Sig Dispense Refill   • QUEtiapine (SEROquel) 300 MG tablet Take 1 tablet by mouth Every Night. 30 tablet 0   • rosuvastatin (CRESTOR) 20 MG tablet Take 1 tablet by mouth Daily.     • rOPINIRole (REQUIP) 2 MG tablet Take 1 tablet by mouth Every Night. (Patient not taking: Reported on 6/15/2023) 30 tablet 0   • vitamin D (ERGOCALCIFEROL) 1.25 MG (63233 UT) capsule capsule Take 1 capsule by mouth Every 7 (Seven) Days. (Patient not taking:  "Reported on 6/15/2023) 12 capsule 3     No current facility-administered medications for this visit.       Review of Systems   Musculoskeletal:         Foot pain   All other systems reviewed and are negative.      OBJECTIVE    Ht 170.2 cm (67\")   Wt 68.9 kg (152 lb)   BMI 23.81 kg/m²     Body mass index is 23.81 kg/m².        Physical Exam  Vitals reviewed.   Constitutional:       Appearance: Normal appearance. He is well-developed.   HENT:      Head: Normocephalic and atraumatic.   Neck:      Trachea: Trachea and phonation normal.   Cardiovascular:      Pulses:           Dorsalis pedis pulses are 1+ on the right side and 1+ on the left side.        Posterior tibial pulses are 1+ on the right side and 1+ on the left side.   Pulmonary:      Effort: Pulmonary effort is normal. No respiratory distress.   Abdominal:      General: There is no distension.      Palpations: Abdomen is soft.   Musculoskeletal:      Right foot: Deformity (cavus deformity) present.   Feet:      Right foot:      Skin integrity: Skin integrity normal.      Toenail Condition: Right toenails are normal.      Left foot:      Skin integrity: Skin integrity normal.      Toenail Condition: Left toenails are normal.      Comments: Pelvis foot, right  Skin:     General: Skin is warm and dry.   Neurological:      Mental Status: He is alert and oriented to person, place, and time.      GCS: GCS eye subscore is 4. GCS verbal subscore is 5. GCS motor subscore is 6.   Psychiatric:         Speech: Speech normal.         Behavior: Behavior normal. Behavior is cooperative.         Thought Content: Thought content normal.         Judgment: Judgment normal.                Procedures        ASSESSMENT AND PLAN    Diagnoses and all orders for this visit:    1. Cavus deformity of right foot (Primary)  -     Ambulatory Referral to Physical Therapy Evaluate and treat      Body mass index is 23.81 kg/m².    Recommend follow-up with primary care to discuss BMI greater " than 30      No pain in the right foot today.  Patient is concerned about his wearing down on the lateral side of the foot.  Recommend referral to sports medicine for custom inserts and an order was sent.        This document has been electronically signed by Claude BARTLETT FNP-C, ONP-C on Erna 15, 2023 15:19 CDT

## 2023-08-16 ENCOUNTER — OFFICE VISIT (OUTPATIENT)
Dept: SLEEP MEDICINE | Facility: HOSPITAL | Age: 53
End: 2023-08-16
Payer: COMMERCIAL

## 2023-08-16 VITALS
WEIGHT: 141.6 LBS | DIASTOLIC BLOOD PRESSURE: 91 MMHG | HEIGHT: 67 IN | SYSTOLIC BLOOD PRESSURE: 122 MMHG | HEART RATE: 98 BPM | OXYGEN SATURATION: 96 % | BODY MASS INDEX: 22.22 KG/M2

## 2023-08-16 DIAGNOSIS — G47.61 PLMD (PERIODIC LIMB MOVEMENT DISORDER): ICD-10-CM

## 2023-08-16 DIAGNOSIS — G47.33 OSA (OBSTRUCTIVE SLEEP APNEA): Primary | ICD-10-CM

## 2023-08-16 DIAGNOSIS — R53.83 OTHER FATIGUE: ICD-10-CM

## 2023-08-16 RX ORDER — ROPINIROLE 0.5 MG/1
TABLET, FILM COATED ORAL
Qty: 35 TABLET | Refills: 0 | Status: SHIPPED | OUTPATIENT
Start: 2023-08-16

## 2023-08-16 NOTE — PROGRESS NOTES
Sleep Clinic Follow Up    Date: 8/16/2023  Primary Care Provider: Nicole Bethea APRN    Last office visit: 06/26/2023 (I reviewed this note)    CC: Follow up: RADHA started on CPAP 31-90 day       Interim History:  Since the last visit:    Brother is with him today during office visit.     1) mild RADHA -  Ehsan Pena has remained compliant with CPAP. He denies mask and machine issues, dry mouth, headaches, or pressures intolerance. He denies abnormal dreams, sleep paralysis, nasal congestion, URI sx.    It took a while for him to adjust.  Last 2 weeks he has used every night. Not waking up early. Feels like he is used to wearing it now.       2) Patient denies RLS symptoms.  States unknown if limb movement during sleep. He sleeps alone.     Still feels fatigued during the day.     Sleep Testing:    PSG on 09/23/2015, AHI of 10.6 PLMI 123     Currently on 5-10 cm H2O    PAP Data:    Time frame: 08/3/2023-08/16/2023   Compliance: 93 %  Average use on days used: 7hrs 58 min  Percent of days with usage greater than or equal to 4 hours: 86%  PAP range: 5-10 cm H2O  Average 90% pressure: 8.8 cmH2O  Leak: 63.8 L/ minutes  Average AHI: 5.8 events/hr  Machine: resmed with modem   Mask type: Nasal mask  DME: [facility]    Bed time: 0100  Sleep latency: 120 minutes  Number of times awakens during the night: 0  Wake time: 1300  Estimated total sleep time at night: 10 hours  Caffeine intake: 0 cups of coffee, 0-1 cups of tea, and 2 sodas per day  Alcohol intake: 0 drinks per week  Nap time: denies    Sleepiness with Driving: does not drive        [place] Sleepiness Scale Score: 1    How likely are you to doze off or fall asleep in the following situation, in contrast to feeling just tired?     Use the following scale to choose the most appropriate number for each situation:    0 = would never doze  1 = slight chance of dozing   2 = moderate chance of dozing   3 = high chance of dozing    It is important that you answer each  question as best you can.      Situation       Chance of Dozing (0-3)    Sitting and reading            ___0____    Watching TV          ___0____    Sitting, inactive in a public place (e.g. a theatre or meeting)   ___0____    As a passenger in a car for an hour without break    ___0____    Lying down to rest in the afternoon, when circumstances permit  ___1____    Sitting and talking to someone      ___0____    Sitting quietly after a lunch without alcohol     ___0____    In a car, while stopped for a few minutes in traffic    ___0____         PMHx, FH, SH reviewed and pertinent changes are: reports unchanged from last office visit      REVIEW OF SYSTEMS:   Negative for chest pain, SOA, fever, chills, cough, N/V/D, abdominal pain.    Smoking:none           Exam:  Vitals:    08/16/23 1532   BP: 122/91   Pulse: 98   SpO2: 96%           08/16/23  1532   Weight: 64.2 kg (141 lb 9.6 oz)     Body mass index is 22.17 kg/mý. BMI is within normal parameters. No other follow-up for BMI required.      Gen:                No distress, conversant, pleasant, appears stated age, alert, oriented  Eyes:               Anicteric sclera, moist conjunctiva, no lid lag                           EOMI   Lungs:             normal effort, non-labored breathing                          Clear to auscultation bilaterally          CV:                  Normal S1/S2, no murmur                          no lower extremity edema                 Psych:             Appropriate affect  Neuro:             CN 2-12 appear intact    Past Medical History:   Diagnosis Date    Dyspnea on exertion     Fatigue     Impulse control disorder     Insomnia     Major depressive disorder, recurrent     Restless legs     Sleep apnea     Spina bifida        Current Outpatient Medications:     QUEtiapine (SEROquel) 300 MG tablet, Take 1 tablet by mouth Every Night., Disp: 30 tablet, Rfl: 0  WBC   Date Value Ref Range Status   06/06/2022 7.4 4.1 - 10.9 THOUS/uL Final      RBC   Date Value Ref Range Status   06/06/2022 4.70 3.35 - 5.50 MIL/uL Final     Hemoglobin   Date Value Ref Range Status   06/06/2022 13.7 12.9 - 16.6 GM/DL Final     Hematocrit   Date Value Ref Range Status   06/06/2022 42.2 38.0 - 48.0 % Final     MCV   Date Value Ref Range Status   06/06/2022 89.8 81.0 - 95.0 FL Final     MCH   Date Value Ref Range Status   06/06/2022 29.1 27.0 - 33.0 PG Final     MCHC   Date Value Ref Range Status   06/06/2022 32.5 (L) 33.0 - 37.0 G/DL Final     RDW   Date Value Ref Range Status   06/06/2022 13.0 11.5 - 14.5 % Final     RDW-SD   Date Value Ref Range Status   06/06/2022 43.0 (H) 35.0 - 42.0 FL Final     MPV   Date Value Ref Range Status   06/06/2022 9.5 7.4 - 10.4 FL Final     Platelets   Date Value Ref Range Status   06/06/2022 300 130 - 400 THOUS/uL Final     Neutrophil Rel %   Date Value Ref Range Status   06/06/2022 59.2 42.2 - 75.2 % Final     Lymphocyte Rel %   Date Value Ref Range Status   06/06/2022 28.2 20.5 - 51.1 % Final     Monocyte Rel %   Date Value Ref Range Status   06/06/2022 8.8 1.7 - 9.3 % Final     Eosinophil %   Date Value Ref Range Status   06/06/2022 2.7 1.0 - 3.0 % Final     Basophil Rel %   Date Value Ref Range Status   06/06/2022 0.8 0.0 - 2.0 % Final     Immature Grans %   Date Value Ref Range Status   06/06/2022 0.3 0.0 - 0.4 % Final     Comment:     IG PARAMETER REFLECTS THE  COMBINATION OF METAMYELOCYTES,  MYELOCYTES, AND PROMYELOCYTES.     Neutrophils Absolute   Date Value Ref Range Status   06/06/2022 4.4 1.7 - 8.7 THOUS/uL Final     Lymphocytes Absolute   Date Value Ref Range Status   06/06/2022 2.1 0.8 - 5.6 THOUS/uL Final     Monocytes Absolute   Date Value Ref Range Status   06/06/2022 0.7 0.1 - 1.0 THOUS/uL Final     Eosinophils Absolute   Date Value Ref Range Status   06/06/2022 0.2 0.0 - 0.3 THOUS/uL Final     Basophils Absolute   Date Value Ref Range Status   06/06/2022 0.1 0.0 - 0.2 THOUS/uL Final     Immature Grans, Absolute   Date  Value Ref Range Status   06/06/2022 0.02 0.00 - 0.04 THOUS/uL Final     nRBC   Date Value Ref Range Status   06/06/2022 0.0 0 /100 WBC'S Final   03/13/2019 0.0 0.0 - 0.0 /100 WBC Final       Lab Results   Component Value Date    GLUCOSE 95 05/25/2021    BUN 23 (H) 05/25/2021    CREATININE 0.99 05/25/2021    BCR 23.2 05/25/2021    K 4.4 05/25/2021    CO2 25.6 05/25/2021    CALCIUM 9.8 05/25/2021    ALBUMIN 4.50 05/25/2021    BILITOT 0.2 05/25/2021    AST 18 05/25/2021    ALT 21 05/25/2021         Assessment and Plan:    Obstructive sleep apnea- Established, stable   Compliant with PAP therapy- compliance report reviewed with patient   Continue PAP as prescribed  Script for PAP supplies  Pertinent labs reviewed   Drowsy driving tips- do not drive if feeling sleepy   Return to clinic in 4 weeks with compliance report unless change in symptoms in interim period  Restless leg syndrome/Periodic limb movement disorder   Start on Requip 0.5 mg nightly. Discussed ramp dosing. Week one take 1/2 tab. Week two take up to 1 tab. Week three take up to 1.5 tabs. Week four take up to 2 tabs.   Discussed usage dosage and possible side effects  Trial of Requip to see if reduction in fatigue level   PAP therapy   Follow-up in 4 weeks   Insomnia - sleep onset and or maintenance -  On Seroquel   Fatigue   Referral back to PCP for fatigue labs   Good sleep hygiene   Healthy diet exercise           Educated on PAP management, maintenance, and compliance.           This document has been electronically signed by TRI Lees on August 16, 2023 15:39 CDT            CC: Nicole Bethea APRN          No ref. provider found

## 2023-08-18 RX ORDER — ROPINIROLE 0.5 MG/1
TABLET, FILM COATED ORAL
Qty: 105 TABLET | OUTPATIENT
Start: 2023-08-18

## 2023-09-28 RX ORDER — ROPINIROLE 0.5 MG/1
TABLET, FILM COATED ORAL
Qty: 35 TABLET | Refills: 0 | Status: SHIPPED | OUTPATIENT
Start: 2023-09-28

## 2023-09-28 RX ORDER — ROPINIROLE 0.5 MG/1
TABLET, FILM COATED ORAL
Qty: 105 TABLET | OUTPATIENT
Start: 2023-09-28

## (undated) DEVICE — SINGLE-USE BIOPSY FORCEPS: Brand: RADIAL JAW 4